# Patient Record
Sex: FEMALE | Race: WHITE | NOT HISPANIC OR LATINO | Employment: OTHER | URBAN - METROPOLITAN AREA
[De-identification: names, ages, dates, MRNs, and addresses within clinical notes are randomized per-mention and may not be internally consistent; named-entity substitution may affect disease eponyms.]

---

## 2018-01-26 ENCOUNTER — OFFICE VISIT (OUTPATIENT)
Dept: NEUROLOGY | Facility: CLINIC | Age: 78
End: 2018-01-26

## 2018-01-26 VITALS
WEIGHT: 110 LBS | BODY MASS INDEX: 20.24 KG/M2 | DIASTOLIC BLOOD PRESSURE: 75 MMHG | HEIGHT: 62 IN | SYSTOLIC BLOOD PRESSURE: 118 MMHG

## 2018-01-26 DIAGNOSIS — F02.80 LATE ONSET ALZHEIMER'S DISEASE WITHOUT BEHAVIORAL DISTURBANCE (HCC): Primary | ICD-10-CM

## 2018-01-26 DIAGNOSIS — G30.1 LATE ONSET ALZHEIMER'S DISEASE WITHOUT BEHAVIORAL DISTURBANCE (HCC): Primary | ICD-10-CM

## 2018-01-26 PROCEDURE — 99205 OFFICE O/P NEW HI 60 MIN: CPT | Performed by: PSYCHIATRY & NEUROLOGY

## 2018-01-26 RX ORDER — ALBUMIN (HUMAN) 12.5 G/50ML
SOLUTION INTRAVENOUS
COMMUNITY
End: 2020-06-16

## 2018-01-26 RX ORDER — PHENOL 1.4 %
600 AEROSOL, SPRAY (ML) MUCOUS MEMBRANE DAILY
COMMUNITY
End: 2020-06-18

## 2018-01-26 RX ORDER — DONEPEZIL HYDROCHLORIDE 5 MG/1
5 TABLET, FILM COATED ORAL DAILY
Qty: 30 TABLET | Refills: 11 | Status: SHIPPED | OUTPATIENT
Start: 2018-01-26 | End: 2018-02-27

## 2018-01-26 RX ORDER — LEVOTHYROXINE SODIUM 0.05 MG/1
TABLET ORAL
Refills: 3 | COMMUNITY
Start: 2017-12-20 | End: 2019-03-15 | Stop reason: SDUPTHER

## 2018-01-26 RX ORDER — MONTELUKAST SODIUM 4 MG/1
TABLET, CHEWABLE ORAL
Refills: 2 | COMMUNITY
Start: 2018-01-18 | End: 2019-02-21 | Stop reason: SDUPTHER

## 2018-01-26 RX ORDER — ESCITALOPRAM OXALATE 10 MG/1
TABLET ORAL
Refills: 5 | COMMUNITY
Start: 2018-01-05 | End: 2018-06-04

## 2018-01-26 RX ORDER — POTASSIUM CHLORIDE 750 MG/1
TABLET, FILM COATED, EXTENDED RELEASE ORAL
COMMUNITY
Start: 2017-10-27 | End: 2020-06-18

## 2018-01-26 RX ORDER — SENNOSIDES 8.6 MG
650 CAPSULE ORAL EVERY 8 HOURS PRN
COMMUNITY
End: 2021-01-28 | Stop reason: SDUPTHER

## 2018-01-26 RX ORDER — ERGOCALCIFEROL (VITAMIN D2) 10 MCG
400 TABLET ORAL DAILY
COMMUNITY
End: 2020-06-18

## 2018-01-26 RX ORDER — AMLODIPINE BESYLATE 2.5 MG/1
TABLET ORAL
Refills: 5 | COMMUNITY
Start: 2018-01-05 | End: 2019-01-30 | Stop reason: SDUPTHER

## 2018-01-26 RX ORDER — LOSARTAN POTASSIUM 100 MG/1
TABLET ORAL
Refills: 2 | COMMUNITY
Start: 2017-12-20 | End: 2019-09-12 | Stop reason: SDUPTHER

## 2018-01-26 NOTE — PROGRESS NOTES
"Subjective     CC: memory loss    History of Present Illness   Julieta King is a 77 y.o. female who comes to clinic today for evaluation of memory impairment. Her family  has noted symptoms since approximately 2014 marked initially by forgetfulness. This has gradually worsened  over time. Additional associated symptoms have included impairments in orientation  and executive function. This has led to difficulty keeping utilities on at home, and missing bills. She has had associated  symptoms of depression . She is currently residing at [facility], where she moved in 11/17.     Prior evaluation has included screening blood work  and a head CT  which were reportedly unremarkable . She may have taken donepezil and memantine in the past, though this is not clear.    I have reviewed and confirmed the past family, social and medical history as accurate on 1/26/18.     Review of Systems   Constitutional: Negative.    Respiratory: Negative.    Cardiovascular: Negative.    Gastrointestinal: Negative.    Genitourinary: Negative.    All other systems reviewed and are negative.      Objective   General appearance today is normal.   Peripheral pulses were present and symmetric.   The ophthalmoscopic exam today is unremarkable. The discs and posterior elements are unremarkable.    /75  Ht 157.5 cm (62\")  Wt 49.9 kg (110 lb)  BMI 20.12 kg/m2    Physical Exam   Neurological: She has normal strength. She has a normal Finger-Nose-Finger Test. Gait normal.   Reflex Scores:       Tricep reflexes are 2+ on the right side and 2+ on the left side.       Bicep reflexes are 2+ on the right side and 2+ on the left side.       Brachioradialis reflexes are 2+ on the right side and 2+ on the left side.       Patellar reflexes are 2+ on the right side and 2+ on the left side.       Achilles reflexes are 2+ on the right side and 2+ on the left side.  Psychiatric: Her speech is normal.        Neurologic Exam     Mental " Status   Oriented to person.   Disoriented to place.   Disoriented to time.   Registration: recalls 3 of 3 objects. Recall of objects at 5 minutes: 0/3. Follows 3 step commands.   Attention: normal.   Speech: speech is normal   Level of consciousness: alert  Knowledge: good.   Able to name object. Able to read. Able to repeat. Able to write. Normal comprehension.     Cranial Nerves   Cranial nerves II through XII intact.     Motor Exam   Muscle bulk: normal  Overall muscle tone: normal    Strength   Strength 5/5 throughout.     Sensory Exam   Light touch normal.     Gait, Coordination, and Reflexes     Gait  Gait: normal    Coordination   Finger to nose coordination: normal    Reflexes   Right brachioradialis: 2+  Left brachioradialis: 2+  Right biceps: 2+  Left biceps: 2+  Right triceps: 2+  Left triceps: 2+  Right patellar: 2+  Left patellar: 2+  Right achilles: 2+  Left achilles: 2+      MMSE=23      Assessment/Plan   Julieta was seen today for memory loss.    Diagnoses and all orders for this visit:    Late onset Alzheimer's disease without behavioral disturbance    Other orders  -     donepezil (ARICEPT) 5 MG tablet; Take 1 tablet by mouth Daily.        DISCUSSION/SUMMARY    Julieta King comes to clinic today for evaluation of memory impairment. Her history and examination, including bedside cognitive testing are most consistent with Alzheimer's Disease .  After discussing potential treatment options, it was elected to add  donepezil. She will then follow up in 1 month , or sooner if needed.     As part of this visit I reviewed prior lab results, reviewed radiology results, reviewed radiology images and obtained additional history from the family which is incorporated in the HPI. Please see above for additional details.

## 2018-02-01 ENCOUNTER — TELEPHONE (OUTPATIENT)
Dept: NEUROLOGY | Facility: CLINIC | Age: 78
End: 2018-02-01
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
## 2018-02-27 ENCOUNTER — OFFICE VISIT (OUTPATIENT)
Dept: NEUROLOGY | Facility: CLINIC | Age: 78
End: 2018-02-27

## 2018-02-27 VITALS — HEIGHT: 62 IN | BODY MASS INDEX: 20.24 KG/M2 | WEIGHT: 110 LBS

## 2018-02-27 DIAGNOSIS — G30.1 LATE ONSET ALZHEIMER'S DISEASE WITHOUT BEHAVIORAL DISTURBANCE (HCC): Primary | ICD-10-CM

## 2018-02-27 DIAGNOSIS — F02.80 LATE ONSET ALZHEIMER'S DISEASE WITHOUT BEHAVIORAL DISTURBANCE (HCC): Primary | ICD-10-CM

## 2018-02-27 PROCEDURE — 99214 OFFICE O/P EST MOD 30 MIN: CPT | Performed by: PHYSICIAN ASSISTANT

## 2018-02-27 RX ORDER — MEMANTINE HYDROCHLORIDE 10 MG/1
TABLET ORAL
Refills: 3 | COMMUNITY
Start: 2018-02-22 | End: 2019-03-21 | Stop reason: SDUPTHER

## 2018-02-27 RX ORDER — DONEPEZIL HYDROCHLORIDE 10 MG/1
10 TABLET, FILM COATED ORAL DAILY
Qty: 30 TABLET | Refills: 11 | Status: SHIPPED | OUTPATIENT
Start: 2018-02-27 | End: 2019-02-18 | Stop reason: SDUPTHER

## 2018-02-27 NOTE — PROGRESS NOTES
"Subjective     Chief Complaint: memory loss      History of Present Illness   Julieta King is a 77 y.o. female who returns to clinic today for evaluation of cognitive impairment. Her family  has noted symptoms since approximately 2014 marked initially by forgetfulness. This has gradually worsened  over time. Additional associated symptoms have included impairments in orientation  and executive function. This has led to difficulty keeping utilities on at home, and missing bills. She has had associated  symptoms of depression . She is currently residing at [facility], where she moved in 11/17.      Prior evaluation has included screening blood work and a head CT which were reportedly unremarkable. She is currently taking donepezil 5mg daily. She may have taken donepezil and memantine in the past, though this is not clear.    Since her last visit in 1/17, she feels essentially unchanged cognitively and her family agrees.       I have reviewed and confirmed the past family, social and medical history as accurate on 2/27/18.     Review of Systems   Constitutional: Negative.    HENT: Negative.    Eyes: Negative.    Respiratory: Negative.    Cardiovascular: Negative.    Gastrointestinal: Negative.    Endocrine: Negative.    Genitourinary: Negative.    Musculoskeletal: Negative.    Skin: Negative.    Allergic/Immunologic: Negative.    Neurological:        As noted in HPI   Hematological: Negative.    Psychiatric/Behavioral:        As noted in HPI       Objective     Ht 157.5 cm (62\")  Wt 49.9 kg (110 lb)  BMI 20.12 kg/m2    General appearance today is normal.       Physical Exam   Constitutional: She is oriented to person, place, and time.   Neurological: She is oriented to person, place, and time. She has normal strength. She has a normal Finger-Nose-Finger Test.   Psychiatric: Her speech is normal.        Neurologic Exam     Mental Status   Oriented to person, place, and time.   Registration: recalls " 3 of 3 objects. Recall of objects at 5 minutes: 0/3 recall. Follows 3 step commands.   Attention: normal.   Speech: speech is normal   Level of consciousness: alert  Able to name object. Able to read. Able to repeat. Able to write. Normal comprehension.     Cranial Nerves   Cranial nerves II through XII intact.     Motor Exam   Muscle bulk: normal  Overall muscle tone: normal    Strength   Strength 5/5 throughout.     Sensory Exam   Light touch normal.     Gait, Coordination, and Reflexes     Coordination   Finger to nose coordination: normal    Tremor   Resting tremor: absent        Results  MMSE= 25 (23 in 1/18)       Assessment/Plan   Julieta was seen today for alzheimer's disease.    Diagnoses and all orders for this visit:    Late onset Alzheimer's disease without behavioral disturbance    Other orders  -     donepezil (ARICEPT) 10 MG tablet; Take 1 tablet by mouth Daily.          Discussion/Summary   Julieta King returns to clinic today for evaluation of Alzheimer's Disease . After discussing potential treatment options, it was elected to increase  donepezil and add memantine. She will then follow up in 3 months , or sooner if needed.       I spent 20 minutes out of 25 minutes face to face with the patient and family and discussing diagnosis, prognosis, evaluation, current status, treatment options and management.    As part of this visit I obtained additional history from the family which is incorporated in the HPI.      Brooke Salgado PA-C

## 2018-03-06 ENCOUNTER — TELEPHONE (OUTPATIENT)
Dept: NEUROLOGY | Facility: CLINIC | Age: 78
End: 2018-03-06

## 2018-03-06 NOTE — TELEPHONE ENCOUNTER
----- Message from Brooke Salgado PA-C sent at 3/5/2018  3:55 PM EST -----  Regarding: FW: episode of confusion  Maddy,    Would you care to let him know that I am not entirely sure what the cause of the episode was. I don't think that it is likely a medication interaction with the alcohol looking at her medication list nor is likely related to solely  the wine as she only had one glass. If it continues or happens more frequently, I would recommend seeing her PCP to rule out an underlying medical illness such as a UTI. Hope that helps. Thanks.       ----- Message -----     From: YOLANDA Jin     Sent: 3/5/2018  11:25 AM       To: Brooke Salgado PA-C  Subject: episode of confusion                             Son said he was having dinner with her last week and she had a moment of confusion where she had a glass of wine and then didn't recognize him and became very paranoid and took stff to calm her down and get in car. Alcohol interaction with meds or something else?

## 2018-05-30 ENCOUNTER — TELEPHONE (OUTPATIENT)
Dept: NEUROLOGY | Facility: CLINIC | Age: 78
End: 2018-05-30

## 2018-06-01 NOTE — TELEPHONE ENCOUNTER
Spoke with the PTs son, he would like to try the increase of LEXAPRO to 20mg. Please send to pharm on file.   I asked him to give us an update on his mother next time he is in town to see if this increase helped any and if not maybe we can try to Mirtazapine then.

## 2018-06-04 RX ORDER — ESCITALOPRAM OXALATE 20 MG/1
20 TABLET ORAL DAILY
Qty: 30 TABLET | Refills: 11 | Status: SHIPPED | OUTPATIENT
Start: 2018-06-04 | End: 2019-04-26 | Stop reason: SDUPTHER

## 2018-06-07 ENCOUNTER — TRANSCRIBE ORDERS (OUTPATIENT)
Dept: ADMINISTRATIVE | Facility: HOSPITAL | Age: 78
End: 2018-06-07

## 2018-06-07 DIAGNOSIS — N95.9 MENOPAUSAL AND PERIMENOPAUSAL DISORDER: Primary | ICD-10-CM

## 2018-06-21 ENCOUNTER — APPOINTMENT (OUTPATIENT)
Dept: BONE DENSITY | Facility: HOSPITAL | Age: 78
End: 2018-06-21
Attending: INTERNAL MEDICINE

## 2018-06-25 ENCOUNTER — TRANSCRIBE ORDERS (OUTPATIENT)
Dept: ADMINISTRATIVE | Facility: HOSPITAL | Age: 78
End: 2018-06-25

## 2018-06-25 DIAGNOSIS — N95.9 MENOPAUSAL AND POSTMENOPAUSAL DISORDER: Primary | ICD-10-CM

## 2019-01-24 RX ORDER — AMLODIPINE BESYLATE 2.5 MG/1
TABLET ORAL
Qty: 30 TABLET | Refills: 0 | OUTPATIENT
Start: 2019-01-24

## 2019-01-30 ENCOUNTER — TELEPHONE (OUTPATIENT)
Dept: INTERNAL MEDICINE | Facility: CLINIC | Age: 79
End: 2019-01-30

## 2019-01-30 RX ORDER — AMLODIPINE BESYLATE 2.5 MG/1
2.5 TABLET ORAL DAILY
Qty: 30 TABLET | Refills: 5 | Status: SHIPPED | OUTPATIENT
Start: 2019-01-30 | End: 2019-06-27 | Stop reason: SDUPTHER

## 2019-01-30 RX ORDER — AMLODIPINE BESYLATE 2.5 MG/1
TABLET ORAL
Refills: 5 | OUTPATIENT
Start: 2019-01-30

## 2019-01-31 ENCOUNTER — TELEPHONE (OUTPATIENT)
Dept: INTERNAL MEDICINE | Facility: CLINIC | Age: 79
End: 2019-01-31

## 2019-02-06 ENCOUNTER — TELEPHONE (OUTPATIENT)
Dept: INTERNAL MEDICINE | Facility: CLINIC | Age: 79
End: 2019-02-06

## 2019-02-06 ENCOUNTER — OFFICE VISIT (OUTPATIENT)
Dept: INTERNAL MEDICINE | Facility: CLINIC | Age: 79
End: 2019-02-06

## 2019-02-06 VITALS
HEIGHT: 62 IN | HEART RATE: 60 BPM | SYSTOLIC BLOOD PRESSURE: 142 MMHG | BODY MASS INDEX: 30.91 KG/M2 | WEIGHT: 168 LBS | DIASTOLIC BLOOD PRESSURE: 78 MMHG

## 2019-02-06 DIAGNOSIS — E66.1 CLASS 1 DRUG-INDUCED OBESITY WITH SERIOUS COMORBIDITY AND BODY MASS INDEX (BMI) OF 30.0 TO 30.9 IN ADULT: ICD-10-CM

## 2019-02-06 DIAGNOSIS — F02.80 LATE ONSET ALZHEIMER'S DISEASE WITHOUT BEHAVIORAL DISTURBANCE (HCC): ICD-10-CM

## 2019-02-06 DIAGNOSIS — E78.2 MIXED HYPERLIPIDEMIA: ICD-10-CM

## 2019-02-06 DIAGNOSIS — E55.9 VITAMIN D DEFICIENCY: ICD-10-CM

## 2019-02-06 DIAGNOSIS — I10 BENIGN ESSENTIAL HYPERTENSION: Primary | ICD-10-CM

## 2019-02-06 DIAGNOSIS — M85.89 OSTEOPENIA OF MULTIPLE SITES: ICD-10-CM

## 2019-02-06 DIAGNOSIS — E03.9 HYPOTHYROIDISM (ACQUIRED): ICD-10-CM

## 2019-02-06 DIAGNOSIS — K21.9 GERD WITHOUT ESOPHAGITIS: ICD-10-CM

## 2019-02-06 DIAGNOSIS — G30.1 LATE ONSET ALZHEIMER'S DISEASE WITHOUT BEHAVIORAL DISTURBANCE (HCC): ICD-10-CM

## 2019-02-06 LAB
25(OH)D3 SERPL-MCNC: 66 NG/ML
ALBUMIN SERPL-MCNC: 4.68 G/DL (ref 3.2–4.8)
ALBUMIN/GLOB SERPL: 2.6 G/DL (ref 1.5–2.5)
ALP SERPL-CCNC: 89 U/L (ref 25–100)
ALT SERPL W P-5'-P-CCNC: 8 U/L (ref 7–40)
ANION GAP SERPL CALCULATED.3IONS-SCNC: 9 MMOL/L (ref 3–11)
AST SERPL-CCNC: 16 U/L (ref 0–33)
BASOPHILS # BLD AUTO: 0.06 10*3/MM3 (ref 0–0.2)
BASOPHILS NFR BLD AUTO: 1 % (ref 0–1)
BILIRUB SERPL-MCNC: 0.6 MG/DL (ref 0.3–1.2)
BUN BLD-MCNC: 15 MG/DL (ref 9–23)
BUN/CREAT SERPL: 20.8 (ref 7–25)
CALCIUM SPEC-SCNC: 10.4 MG/DL (ref 8.7–10.4)
CHLORIDE SERPL-SCNC: 107 MMOL/L (ref 99–109)
CO2 SERPL-SCNC: 24 MMOL/L (ref 20–31)
CREAT BLD-MCNC: 0.72 MG/DL (ref 0.6–1.3)
DEPRECATED RDW RBC AUTO: 45.3 FL (ref 37–54)
EOSINOPHIL # BLD AUTO: 0.37 10*3/MM3 (ref 0–0.3)
EOSINOPHIL NFR BLD AUTO: 6 % (ref 0–3)
ERYTHROCYTE [DISTWIDTH] IN BLOOD BY AUTOMATED COUNT: 13.1 % (ref 11.3–14.5)
GFR SERPL CREATININE-BSD FRML MDRD: 78 ML/MIN/1.73
GLOBULIN UR ELPH-MCNC: 1.8 GM/DL
GLUCOSE BLD-MCNC: 89 MG/DL (ref 70–100)
HCT VFR BLD AUTO: 44 % (ref 34.5–44)
HGB BLD-MCNC: 13.6 G/DL (ref 11.5–15.5)
IMM GRANULOCYTES # BLD AUTO: 0.01 10*3/MM3 (ref 0–0.03)
IMM GRANULOCYTES NFR BLD AUTO: 0.2 % (ref 0–0.6)
LYMPHOCYTES # BLD AUTO: 1.59 10*3/MM3 (ref 0.6–4.8)
LYMPHOCYTES NFR BLD AUTO: 25.7 % (ref 24–44)
MCH RBC QN AUTO: 29.3 PG (ref 27–31)
MCHC RBC AUTO-ENTMCNC: 30.9 G/DL (ref 32–36)
MCV RBC AUTO: 94.8 FL (ref 80–99)
MONOCYTES # BLD AUTO: 0.52 10*3/MM3 (ref 0–1)
MONOCYTES NFR BLD AUTO: 8.4 % (ref 0–12)
NEUTROPHILS # BLD AUTO: 3.63 10*3/MM3 (ref 1.5–8.3)
NEUTROPHILS NFR BLD AUTO: 58.7 % (ref 41–71)
PLATELET # BLD AUTO: 285 10*3/MM3 (ref 150–450)
PMV BLD AUTO: 10.7 FL (ref 6–12)
POTASSIUM BLD-SCNC: 4.4 MMOL/L (ref 3.5–5.5)
PROT SERPL-MCNC: 6.5 G/DL (ref 5.7–8.2)
RBC # BLD AUTO: 4.64 10*6/MM3 (ref 3.89–5.14)
SODIUM BLD-SCNC: 140 MMOL/L (ref 132–146)
TSH SERPL DL<=0.05 MIU/L-ACNC: 1.07 MIU/ML (ref 0.35–5.35)
WBC NRBC COR # BLD: 6.18 10*3/MM3 (ref 3.5–10.8)

## 2019-02-06 PROCEDURE — 82306 VITAMIN D 25 HYDROXY: CPT | Performed by: INTERNAL MEDICINE

## 2019-02-06 PROCEDURE — 36415 COLL VENOUS BLD VENIPUNCTURE: CPT | Performed by: INTERNAL MEDICINE

## 2019-02-06 PROCEDURE — 99214 OFFICE O/P EST MOD 30 MIN: CPT | Performed by: INTERNAL MEDICINE

## 2019-02-06 PROCEDURE — 80053 COMPREHEN METABOLIC PANEL: CPT | Performed by: INTERNAL MEDICINE

## 2019-02-06 PROCEDURE — 85025 COMPLETE CBC W/AUTO DIFF WBC: CPT | Performed by: INTERNAL MEDICINE

## 2019-02-06 PROCEDURE — 84443 ASSAY THYROID STIM HORMONE: CPT | Performed by: INTERNAL MEDICINE

## 2019-02-06 NOTE — PATIENT INSTRUCTIONS
Continue current medications for blood pressure and cholesterol and thyroid and memory.  Try to be more active and walk around a lot more.  Try to avoid eating too much salt.  Try to avoid eating desserts more than once or twice a week.  We will continue having [employer] fell your pillboxes.  We are not making any changes in medications today.    Social activities and reading and physical exercise and walking all help our memory.

## 2019-02-06 NOTE — PROGRESS NOTES
[facility]     Julieta King  1940   [id number]      Patient Care Team:  Mary Jane Goss MD as PCP - General  Mary Jane Goss MD as PCP - Family Medicine    Chief Complaint;:   Chief Complaint   Patient presents with   • Med Refill   • Hypertension            HPI  Patient is a 78 y.o. female presents with follow-up of chronic conditions including hypertension and hypothyroidism and dementia.  She is here with her son today to contributes to the history.      CHRONIC CONDITIONS  She lives at [facility] in independent living.  She likes it a lot.  She states that the food is good and they do all the cooking and they clean her room so she is happy.  She enjoys the social aspects of living there.    She is taking her medications as prescribed.  [employer] fills her pill boxes every week.  Her son states that her memory problems have slowed down a bit since she has been on donepezil and memantine.  She is not having any trouble dressing.  She states that she knows when it is time to go to the dining room for meals.  She has not had any trouble walking.  She denies trouble bathing.    She has not checked her blood pressure since she was here last.  She eats food that they prepare in the dining room at [facility] so she does get more salt and sugars and desserts than we would think is ideal.  But she is happy and feels well.    She does not really get any exercise other than walking around at [facility].  She is on vitamin D 400 units a day.    She denies any heartburn or indigestion.    She is taking the level thyroxine daily.        Past Medical History:   Diagnosis Date   • Benign essential hypertension    • GERD without esophagitis    • Hyperlipidemia    • Hypertension    • Hypothyroidism (acquired)    • Memory deficit    • Mixed hyperlipidemia    • Osteopenia of multiple sites    • Seborrheic keratoses    • Vitamin D deficiency        Past Surgical History:   Procedure Laterality Date    • BLEPHAROPLASTY Bilateral 2007   • TOTAL HIP ARTHROPLASTY Bilateral 1992   • TOTAL HIP ARTHROPLASTY REVISION Right 01/28/2013   • TOTAL HIP ARTHROPLASTY REVISION Left 2012       Family History   Problem Relation Age of Onset   • Dementia Father    • Diabetes Father    • Coronary artery disease Father    • Hypertension Father    • Coronary artery disease Mother    • Hypertension Mother    • Stroke Mother         hemorrhagic after TPA   • Coronary artery disease Paternal Uncle    • Ovarian cancer Maternal Grandmother        Social History     Socioeconomic History   • Marital status:      Spouse name: Not on file   • Number of children: Not on file   • Years of education: Not on file   • Highest education level: Not on file   Social Needs   • Financial resource strain: Not on file   • Food insecurity - worry: Not on file   • Food insecurity - inability: Not on file   • Transportation needs - medical: Not on file   • Transportation needs - non-medical: Not on file   Occupational History   • Not on file   Tobacco Use   • Smoking status: Never Smoker   • Smokeless tobacco: Never Used   Substance and Sexual Activity   • Alcohol use: Yes     Comment: rarely   • Drug use: No   • Sexual activity: Defer   Other Topics Concern   • Not on file   Social History Narrative   • Not on file       Allergies   Allergen Reactions   • Lisinopril Cough       Review of Systems:     Review of Systems   Constitutional: Negative for chills and fever.   HENT: Negative for sinus pressure and sore throat.    Respiratory: Negative for chest tightness and shortness of breath.    Cardiovascular: Negative for chest pain and palpitations.   Gastrointestinal: Negative for abdominal pain, diarrhea and GERD.   Endocrine: Negative for cold intolerance and heat intolerance.   Genitourinary: Negative for dysuria and frequency.   Musculoskeletal: Negative for arthralgias, back pain and neck pain.   Skin: Negative for color change and rash.  "  Neurological: Positive for memory problem. Negative for dizziness and headache.   Psychiatric/Behavioral: Negative for depressed mood. The patient is not nervous/anxious.        Vital Signs  Vitals:    02/06/19 1340 02/06/19 1501   BP: 168/90 142/78   BP Location: Right arm Right arm   Patient Position: Sitting Sitting   Cuff Size: Adult Adult   Pulse: 60    Weight: 76.2 kg (168 lb)    Height: 157.5 cm (62.01\")    PainSc: 0-No pain      Body mass index is 30.72 kg/m².      Current Outpatient Medications:   •  acetaminophen (TYLENOL) 650 MG 8 hr tablet, Take 650 mg by mouth Every 8 (Eight) Hours As Needed for Mild Pain ., Disp: , Rfl:   •  albumin human (ALBUTEIN) 25 % 25%, Infuse  into a venous catheter., Disp: , Rfl:   •  amLODIPine (NORVASC) 2.5 MG tablet, Take 1 tablet by mouth Daily., Disp: 30 tablet, Rfl: 5  •  calcium carbonate (OS-SONIDO) 600 MG tablet, Take 600 mg by mouth Daily., Disp: , Rfl:   •  colestipol (COLESTID) 1 g tablet, , Disp: , Rfl: 2  •  Cyanocobalamin (VITAMIN B-12) 500 MCG lozenge, Take  by mouth., Disp: , Rfl:   •  donepezil (ARICEPT) 10 MG tablet, Take 1 tablet by mouth Daily., Disp: 30 tablet, Rfl: 11  •  escitalopram (LEXAPRO) 20 MG tablet, Take 1 tablet by mouth Daily., Disp: 30 tablet, Rfl: 11  •  levothyroxine (SYNTHROID, LEVOTHROID) 50 MCG tablet, , Disp: , Rfl: 3  •  losartan (COZAAR) 100 MG tablet, , Disp: , Rfl: 2  •  memantine (NAMENDA) 10 MG tablet, , Disp: , Rfl: 3  •  potassium chloride (K-DUR) 10 MEQ CR tablet, , Disp: , Rfl:   •  Probiotic Product (PROBIOTIC DAILY PO), Take  by mouth., Disp: , Rfl:   •  Vitamin D, Cholecalciferol, (CHOLECALCIFEROL) 400 units tablet, Take 400 Units by mouth Daily., Disp: , Rfl:     Physical Exam:    Physical Exam   Constitutional: She is oriented to person, place, and time. She appears well-developed and well-nourished. She is obese.  HENT:   Head: Normocephalic.   Eyes: Conjunctivae and EOM are normal. Pupils are equal, round, and reactive to " light.   Neck: Normal range of motion. Neck supple. No thyromegaly present.   Cardiovascular: Normal rate, regular rhythm, normal heart sounds and intact distal pulses.   Pulmonary/Chest: Effort normal and breath sounds normal.   Musculoskeletal: Normal range of motion. She exhibits no edema.   Lymphadenopathy:     She has no cervical adenopathy.   Neurological: She is alert and oriented to person, place, and time. She has normal strength. No cranial nerve deficit or sensory deficit. Gait normal.   Psychiatric: She has a normal mood and affect. Her speech is normal and behavior is normal. Thought content normal. Her mood appears not anxious. Cognition and memory are impaired. She does not exhibit a depressed mood. She exhibits abnormal recent memory.   Nursing note and vitals reviewed.       Results Review:    None    CMP:  Lab Results   Component Value Date    BUN <5 (L) 01/12/2015    CREATININE 0.4 (L) 01/12/2015     01/12/2015    K 3.8 01/12/2015    CO2 26 01/12/2015    CALCIUM 9.1 01/12/2015    ALBUMIN 3.6 01/12/2015    BILITOT 0.4 01/12/2015    ALKPHOS 50 01/12/2015    AST 23 01/12/2015    ALT 78 (H) 01/12/2015     HbA1c:  No results found for: HGBA1C  Microalbumin:  No results found for: MICROALBUR, POCMALB, POCCREAT  Lipid Panel  Lab Results   Component Value Date    AST 23 01/12/2015    ALT 78 (H) 01/12/2015       Medication Review: Medications reviewed and noted    Assessment/Plan:    Julieta was seen today for med refill and hypertension.    Diagnoses and all orders for this visit:    Benign essential hypertension  -     Comprehensive Metabolic Panel; Future  -     CBC & Differential; Future  -     Comprehensive Metabolic Panel  -     CBC & Differential  -     CBC Auto Differential    Mixed hyperlipidemia    Vitamin D deficiency  -     Vitamin D 25 hydroxy; Future  -     Vitamin D 25 hydroxy    GERD without esophagitis    Late onset Alzheimer's disease without behavioral disturbance    Osteopenia of  multiple sites  -     Vitamin D 25 hydroxy; Future  -     Vitamin D 25 hydroxy    Hypothyroidism (acquired)  -     TSH; Future  -     TSH    Class 1 drug-induced obesity with serious comorbidity and body mass index (BMI) of 30.0 to 30.9 in adult        Patient Instructions   Continue current medications for blood pressure and cholesterol and thyroid and memory.  Try to be more active and walk around a lot more.  Try to avoid eating too much salt.  Try to avoid eating desserts more than once or twice a week.  We will continue having [employer] fell your pillboxes.  We are not making any changes in medications today.    Social activities and reading and physical exercise and walking all help our memory.      Plan of care reviewed with patient at the conclusion of today's visit. Education was provided regarding diagnosis, management, and any prescribed or recommended OTC medications.Patient verbalizes understanding of and agreement with management plan.         Mary Jane Goss MD

## 2019-02-18 ENCOUNTER — TELEPHONE (OUTPATIENT)
Dept: NEUROLOGY | Facility: CLINIC | Age: 79
End: 2019-02-18

## 2019-02-18 RX ORDER — DONEPEZIL HYDROCHLORIDE 10 MG/1
10 TABLET, FILM COATED ORAL DAILY
Qty: 30 TABLET | Refills: 11 | Status: SHIPPED | OUTPATIENT
Start: 2019-02-18 | End: 2020-01-22

## 2019-02-18 NOTE — TELEPHONE ENCOUNTER
----- Message from Cedrick Cole Rep sent at 2/18/2019  1:25 PM EST -----  Contact: PHARMACY  CYNTHIA SHAIKH:    PT NEEDS REFILL ON : donepezil (ARICEPT) 10 MG tablet  PHARMACY: [facility]    CALLBACK: [phone] EXT 3, ASK FOR DORCAS.

## 2019-02-21 RX ORDER — MONTELUKAST SODIUM 4 MG/1
TABLET, CHEWABLE ORAL
Qty: 60 TABLET | Refills: 2 | Status: SHIPPED | OUTPATIENT
Start: 2019-02-21 | End: 2019-05-22 | Stop reason: SDUPTHER

## 2019-03-15 RX ORDER — LEVOTHYROXINE SODIUM 0.05 MG/1
TABLET ORAL
Qty: 90 TABLET | Refills: 3 | Status: SHIPPED | OUTPATIENT
Start: 2019-03-15 | End: 2020-03-05

## 2019-03-21 RX ORDER — MEMANTINE HYDROCHLORIDE 10 MG/1
TABLET ORAL
Qty: 60 TABLET | Refills: 2 | Status: SHIPPED | OUTPATIENT
Start: 2019-03-21 | End: 2019-06-20 | Stop reason: SDUPTHER

## 2019-04-26 RX ORDER — ESCITALOPRAM OXALATE 20 MG/1
20 TABLET ORAL DAILY
Qty: 30 TABLET | Refills: 11 | Status: SHIPPED | OUTPATIENT
Start: 2019-04-26 | End: 2020-04-10

## 2019-05-22 RX ORDER — MONTELUKAST SODIUM 4 MG/1
TABLET, CHEWABLE ORAL
Qty: 60 TABLET | Refills: 2 | Status: SHIPPED | OUTPATIENT
Start: 2019-05-22 | End: 2019-08-23 | Stop reason: SDUPTHER

## 2019-06-20 RX ORDER — MEMANTINE HYDROCHLORIDE 10 MG/1
TABLET ORAL
Qty: 60 TABLET | Refills: 2 | Status: SHIPPED | OUTPATIENT
Start: 2019-06-20 | End: 2019-09-19 | Stop reason: SDUPTHER

## 2019-06-27 ENCOUNTER — TELEPHONE (OUTPATIENT)
Dept: INTERNAL MEDICINE | Facility: CLINIC | Age: 79
End: 2019-06-27

## 2019-06-27 DIAGNOSIS — R41.82 ALTERED MENTAL STATUS, UNSPECIFIED ALTERED MENTAL STATUS TYPE: Primary | ICD-10-CM

## 2019-06-27 RX ORDER — AMLODIPINE BESYLATE 2.5 MG/1
2.5 TABLET ORAL DAILY
Qty: 30 TABLET | Refills: 5 | Status: SHIPPED | OUTPATIENT
Start: 2019-06-27 | End: 2019-12-20

## 2019-06-27 NOTE — TELEPHONE ENCOUNTER
WILFREDO FROM [facility] [phone]  CALLED AND STATED THAT PATIENT IS AT [facility] AND THE NURSING STAFF IS CONCERNED ABOUT PATIENT.    SHE IS DISPLAYING ALTERED MENTAL STATUS, CONFUSION AND AGITATION.  WOULD LIKE FOR HOME HEALTH TO COME OUT AND ACCESS PATIENT WITH VITALS AND COLLECT A URINE FOR U/A AND CULTURE    NEED ORDER FAXED [phone]  AND NEED LAST OFFICE NOTE, DEMOGRAPHIC FACE SHEET.    INFORMATION RELAYED VERBALLY TO CARTER BRO AND SHE AGREED.     INFORMATION FAXED OVER

## 2019-07-03 ENCOUNTER — TELEPHONE (OUTPATIENT)
Dept: INTERNAL MEDICINE | Facility: CLINIC | Age: 79
End: 2019-07-03

## 2019-07-03 NOTE — TELEPHONE ENCOUNTER
Called to request results for urine culture ordered 6/27/19.  Rosa RN (Home Health) states patient refused testing.       Order was canceled

## 2019-08-23 RX ORDER — MONTELUKAST SODIUM 4 MG/1
TABLET, CHEWABLE ORAL
Qty: 60 TABLET | Refills: 2 | Status: SHIPPED | OUTPATIENT
Start: 2019-08-23 | End: 2019-11-22 | Stop reason: SDUPTHER

## 2019-09-12 RX ORDER — LOSARTAN POTASSIUM 100 MG/1
TABLET ORAL
Qty: 90 TABLET | Refills: 1 | Status: SHIPPED | OUTPATIENT
Start: 2019-09-12 | End: 2020-03-11

## 2019-09-19 RX ORDER — MEMANTINE HYDROCHLORIDE 10 MG/1
TABLET ORAL
Qty: 60 TABLET | Refills: 2 | Status: SHIPPED | OUTPATIENT
Start: 2019-09-19 | End: 2019-12-20

## 2019-09-30 PROBLEM — Z96.652 CHRONIC KNEE PAIN AFTER TOTAL REPLACEMENT OF LEFT KNEE JOINT: Status: ACTIVE | Noted: 2019-09-30

## 2019-09-30 PROBLEM — M25.562 CHRONIC KNEE PAIN AFTER TOTAL REPLACEMENT OF LEFT KNEE JOINT: Status: ACTIVE | Noted: 2019-09-30

## 2019-09-30 PROBLEM — M19.90 OSTEOARTHRITIS: Status: ACTIVE | Noted: 2019-09-30

## 2019-09-30 PROBLEM — G89.29 CHRONIC KNEE PAIN AFTER TOTAL REPLACEMENT OF LEFT KNEE JOINT: Status: ACTIVE | Noted: 2019-09-30

## 2019-09-30 PROBLEM — M25.552 PAIN OF BOTH HIP JOINTS: Status: ACTIVE | Noted: 2019-09-30

## 2019-09-30 PROBLEM — M25.551 PAIN OF BOTH HIP JOINTS: Status: ACTIVE | Noted: 2019-09-30

## 2019-09-30 PROBLEM — E83.52 HYPERCALCEMIA: Status: ACTIVE | Noted: 2019-09-30

## 2019-09-30 PROBLEM — E87.1 HYPONATREMIA: Status: ACTIVE | Noted: 2019-09-30

## 2019-11-22 RX ORDER — MONTELUKAST SODIUM 4 MG/1
TABLET, CHEWABLE ORAL
Qty: 60 TABLET | Refills: 0 | Status: SHIPPED | OUTPATIENT
Start: 2019-11-22 | End: 2020-01-22

## 2019-12-12 RX ORDER — LEVOTHYROXINE SODIUM 0.05 MG/1
TABLET ORAL
Qty: 90 TABLET | Refills: 3 | OUTPATIENT
Start: 2019-12-12

## 2019-12-20 RX ORDER — AMLODIPINE BESYLATE 2.5 MG/1
2.5 TABLET ORAL DAILY
Qty: 30 TABLET | Refills: 2 | Status: SHIPPED | OUTPATIENT
Start: 2019-12-20 | End: 2020-03-18 | Stop reason: SDUPTHER

## 2019-12-20 RX ORDER — MEMANTINE HYDROCHLORIDE 10 MG/1
TABLET ORAL
Qty: 60 TABLET | Refills: 2 | Status: SHIPPED | OUTPATIENT
Start: 2019-12-20 | End: 2020-03-18 | Stop reason: SDUPTHER

## 2020-01-22 RX ORDER — DONEPEZIL HYDROCHLORIDE 10 MG/1
10 TABLET, FILM COATED ORAL DAILY
Qty: 30 TABLET | Refills: 0 | Status: SHIPPED | OUTPATIENT
Start: 2020-01-22 | End: 2020-03-31 | Stop reason: SDUPTHER

## 2020-01-22 RX ORDER — MONTELUKAST SODIUM 4 MG/1
TABLET, CHEWABLE ORAL
Qty: 60 TABLET | Refills: 0 | Status: SHIPPED | OUTPATIENT
Start: 2020-01-22 | End: 2020-02-25 | Stop reason: SDUPTHER

## 2020-01-22 NOTE — TELEPHONE ENCOUNTER
Filled for 30 day supply, 0 refills    Note: PLEASE SCHEDULE FUP APPT FOR FURTHER REFILLS. THANKS!

## 2020-02-21 RX ORDER — MONTELUKAST SODIUM 4 MG/1
1 TABLET, CHEWABLE ORAL 2 TIMES DAILY
Qty: 60 TABLET | Refills: 0 | Status: CANCELLED | OUTPATIENT
Start: 2020-02-21

## 2020-02-21 NOTE — TELEPHONE ENCOUNTER
Siddhartha requested refill on patients Colestipol . I called to let her know we can refill it until she makes and keeps an appt

## 2020-02-25 RX ORDER — MONTELUKAST SODIUM 4 MG/1
1 TABLET, CHEWABLE ORAL 2 TIMES DAILY
Qty: 60 TABLET | Refills: 2 | Status: SHIPPED | OUTPATIENT
Start: 2020-02-25 | End: 2020-05-27

## 2020-02-25 NOTE — TELEPHONE ENCOUNTER
Called son advised per note he verbalized understanding and said he will get with his wife to see when would be a good time to come and call back tomorrow

## 2020-02-25 NOTE — TELEPHONE ENCOUNTER
Called patients son advised him that patient needs to make an appt. to get her refill of Colestipol he said his mom has Alzheimer's and lives in assisted living he lives in [place] and will have to drive 8 hrs. or fly in to get her here just to get patients cholesterol medication but if he has to he will

## 2020-02-25 NOTE — TELEPHONE ENCOUNTER
Call the son back and let him know that I did send refills in .  I understand that he is far away.  We do need to see her at least once a year to be able to continue prescribing and also to make sure she is doing okay with what we are giving her.    Make an [facility] appointment (both parts with me) for sometime within the next 3 months that is convenient for him.

## 2020-03-05 RX ORDER — LEVOTHYROXINE SODIUM 0.05 MG/1
TABLET ORAL
Qty: 30 TABLET | Refills: 0 | Status: SHIPPED | OUTPATIENT
Start: 2020-03-05 | End: 2020-04-07

## 2020-03-11 RX ORDER — LOSARTAN POTASSIUM 100 MG/1
TABLET ORAL
Qty: 90 TABLET | Refills: 0 | Status: SHIPPED | OUTPATIENT
Start: 2020-03-11 | End: 2020-06-16 | Stop reason: SDUPTHER

## 2020-03-18 DIAGNOSIS — I10 BENIGN ESSENTIAL HYPERTENSION: Primary | ICD-10-CM

## 2020-03-18 RX ORDER — MEMANTINE HYDROCHLORIDE 10 MG/1
10 TABLET ORAL 2 TIMES DAILY
Qty: 60 TABLET | Refills: 0 | Status: SHIPPED | OUTPATIENT
Start: 2020-03-18 | End: 2020-05-13

## 2020-03-18 RX ORDER — AMLODIPINE BESYLATE 2.5 MG/1
2.5 TABLET ORAL DAILY
Qty: 30 TABLET | Refills: 0 | Status: SHIPPED | OUTPATIENT
Start: 2020-03-18 | End: 2020-05-21

## 2020-03-31 RX ORDER — DONEPEZIL HYDROCHLORIDE 10 MG/1
10 TABLET, FILM COATED ORAL DAILY
Qty: 30 TABLET | Refills: 1 | Status: SHIPPED | OUTPATIENT
Start: 2020-03-31 | End: 2020-04-20

## 2020-04-07 RX ORDER — LEVOTHYROXINE SODIUM 0.05 MG/1
TABLET ORAL
Qty: 30 TABLET | Refills: 0 | Status: SHIPPED | OUTPATIENT
Start: 2020-04-07 | End: 2020-05-04

## 2020-04-07 NOTE — TELEPHONE ENCOUNTER
Please contact patient's son and talk to him about possibly getting the doctor at the facility where his mother lives to follow her since I know he [place] out of state and it is very difficult for him to come home and bring her to our office.  It is concerning that no doctor has seen her since February 2019.  She has not had any labs since then either.  I would feel more comfortable if someone were looking out after her.    If there is not a doctor who goes to her facility, there are doctors groups that do only housecalls and do not have offices.    I will send another 1 month supply of the levothyroxine to give him time to arrange for her care.

## 2020-04-10 RX ORDER — ESCITALOPRAM OXALATE 20 MG/1
20 TABLET ORAL DAILY
Qty: 30 TABLET | Refills: 11 | Status: SHIPPED | OUTPATIENT
Start: 2020-04-10 | End: 2020-06-16 | Stop reason: SDUPTHER

## 2020-04-20 RX ORDER — DONEPEZIL HYDROCHLORIDE 10 MG/1
TABLET, FILM COATED ORAL
Qty: 30 TABLET | Refills: 1 | Status: SHIPPED | OUTPATIENT
Start: 2020-04-20 | End: 2020-06-16 | Stop reason: SDUPTHER

## 2020-05-04 RX ORDER — LEVOTHYROXINE SODIUM 0.05 MG/1
TABLET ORAL
Qty: 30 TABLET | Refills: 5 | Status: SHIPPED | OUTPATIENT
Start: 2020-05-04 | End: 2020-05-04 | Stop reason: SDUPTHER

## 2020-05-04 RX ORDER — LEVOTHYROXINE SODIUM 0.05 MG/1
50 TABLET ORAL DAILY
Qty: 30 TABLET | Refills: 0 | Status: SHIPPED | OUTPATIENT
Start: 2020-05-04 | End: 2020-12-03

## 2020-05-13 RX ORDER — MEMANTINE HYDROCHLORIDE 10 MG/1
TABLET ORAL
Qty: 60 TABLET | Refills: 0 | Status: SHIPPED | OUTPATIENT
Start: 2020-05-13 | End: 2020-06-12

## 2020-05-20 DIAGNOSIS — I10 BENIGN ESSENTIAL HYPERTENSION: ICD-10-CM

## 2020-05-21 RX ORDER — AMLODIPINE BESYLATE 2.5 MG/1
TABLET ORAL
Qty: 30 TABLET | Refills: 0 | Status: SHIPPED | OUTPATIENT
Start: 2020-05-21 | End: 2020-05-22

## 2020-05-22 DIAGNOSIS — I10 BENIGN ESSENTIAL HYPERTENSION: ICD-10-CM

## 2020-05-22 RX ORDER — AMLODIPINE BESYLATE 2.5 MG/1
TABLET ORAL
Qty: 30 TABLET | Refills: 1 | Status: SHIPPED | OUTPATIENT
Start: 2020-05-22 | End: 2020-06-16 | Stop reason: SDUPTHER

## 2020-05-27 RX ORDER — MONTELUKAST SODIUM 4 MG/1
1 TABLET, CHEWABLE ORAL 2 TIMES DAILY
Qty: 60 TABLET | Refills: 5 | Status: SHIPPED | OUTPATIENT
Start: 2020-05-27 | End: 2020-06-16 | Stop reason: SDUPTHER

## 2020-06-12 RX ORDER — MEMANTINE HYDROCHLORIDE 10 MG/1
TABLET ORAL
Qty: 60 TABLET | Refills: 0 | Status: SHIPPED | OUTPATIENT
Start: 2020-06-12 | End: 2020-06-16 | Stop reason: SDUPTHER

## 2020-06-12 NOTE — TELEPHONE ENCOUNTER
We are constantly getting request for refills on Ms. King medications.  She has dementia and lives at assisted living.  Her son lives in [place] I think.  He does not feel he can get away and come to take her to appointments.  Most of the assisted living places have people who will drive residence to their appointments so I do not know if hers does not?      Please call the son to see what we can do.  I last saw her February 2019.  It is dangerous for me to continue refilling prescriptions when I do not know at all what is going on with her.    There may be a doctor or PA who makes rounds at her assisted living place and that person could be her primary care provider.  That would be much safer because they could check on her frequently.

## 2020-06-16 ENCOUNTER — LAB (OUTPATIENT)
Dept: LAB | Facility: HOSPITAL | Age: 80
End: 2020-06-16

## 2020-06-16 ENCOUNTER — OFFICE VISIT (OUTPATIENT)
Dept: INTERNAL MEDICINE | Facility: CLINIC | Age: 80
End: 2020-06-16

## 2020-06-16 VITALS
BODY MASS INDEX: 32.32 KG/M2 | HEIGHT: 58 IN | SYSTOLIC BLOOD PRESSURE: 124 MMHG | HEART RATE: 72 BPM | WEIGHT: 154 LBS | TEMPERATURE: 97.7 F | DIASTOLIC BLOOD PRESSURE: 80 MMHG

## 2020-06-16 DIAGNOSIS — K59.1 FUNCTIONAL DIARRHEA: ICD-10-CM

## 2020-06-16 DIAGNOSIS — G30.1 LATE ONSET ALZHEIMER'S DISEASE WITHOUT BEHAVIORAL DISTURBANCE (HCC): ICD-10-CM

## 2020-06-16 DIAGNOSIS — E03.9 HYPOTHYROIDISM (ACQUIRED): ICD-10-CM

## 2020-06-16 DIAGNOSIS — I10 BENIGN ESSENTIAL HYPERTENSION: ICD-10-CM

## 2020-06-16 DIAGNOSIS — E55.9 VITAMIN D DEFICIENCY: ICD-10-CM

## 2020-06-16 DIAGNOSIS — R45.86 MOOD SWINGS: Primary | ICD-10-CM

## 2020-06-16 DIAGNOSIS — R82.90 ABNORMAL URINALYSIS: ICD-10-CM

## 2020-06-16 DIAGNOSIS — R45.86 MOOD SWINGS: ICD-10-CM

## 2020-06-16 DIAGNOSIS — F02.80 LATE ONSET ALZHEIMER'S DISEASE WITHOUT BEHAVIORAL DISTURBANCE (HCC): ICD-10-CM

## 2020-06-16 LAB
BILIRUB BLD-MCNC: ABNORMAL MG/DL
CLARITY, POC: ABNORMAL
COLOR UR: ABNORMAL
GLUCOSE UR STRIP-MCNC: NEGATIVE MG/DL
KETONES UR QL: ABNORMAL
LEUKOCYTE EST, POC: ABNORMAL
NITRITE UR-MCNC: NEGATIVE MG/ML
PH UR: 5.5 [PH] (ref 5–8)
POC CREATININE URINE: NORMAL
POC MICROALBUMIN URINE: NORMAL
PROT UR STRIP-MCNC: ABNORMAL MG/DL
RBC # UR STRIP: NEGATIVE /UL
SP GR UR: 1.02 (ref 1–1.03)
T4 FREE SERPL-MCNC: 1.31 NG/DL (ref 0.93–1.7)
TSH SERPL DL<=0.05 MIU/L-ACNC: 2.17 UIU/ML (ref 0.27–4.2)
UROBILINOGEN UR QL: NORMAL

## 2020-06-16 PROCEDURE — 84439 ASSAY OF FREE THYROXINE: CPT

## 2020-06-16 PROCEDURE — 84443 ASSAY THYROID STIM HORMONE: CPT

## 2020-06-16 PROCEDURE — 80053 COMPREHEN METABOLIC PANEL: CPT

## 2020-06-16 PROCEDURE — 82306 VITAMIN D 25 HYDROXY: CPT

## 2020-06-16 PROCEDURE — 81003 URINALYSIS AUTO W/O SCOPE: CPT | Performed by: INTERNAL MEDICINE

## 2020-06-16 PROCEDURE — 82607 VITAMIN B-12: CPT

## 2020-06-16 PROCEDURE — 87086 URINE CULTURE/COLONY COUNT: CPT

## 2020-06-16 PROCEDURE — 82044 UR ALBUMIN SEMIQUANTITATIVE: CPT | Performed by: INTERNAL MEDICINE

## 2020-06-16 PROCEDURE — 99214 OFFICE O/P EST MOD 30 MIN: CPT | Performed by: INTERNAL MEDICINE

## 2020-06-16 PROCEDURE — 85025 COMPLETE CBC W/AUTO DIFF WBC: CPT

## 2020-06-16 RX ORDER — AMLODIPINE BESYLATE 2.5 MG/1
2.5 TABLET ORAL DAILY
Qty: 30 TABLET | Refills: 11 | Status: SHIPPED | OUTPATIENT
Start: 2020-06-16 | End: 2021-01-28 | Stop reason: SDUPTHER

## 2020-06-16 RX ORDER — ESCITALOPRAM OXALATE 20 MG/1
10 TABLET ORAL DAILY
Qty: 30 TABLET | Refills: 11 | Status: SHIPPED | OUTPATIENT
Start: 2020-06-16 | End: 2021-01-28 | Stop reason: SDUPTHER

## 2020-06-16 RX ORDER — LOSARTAN POTASSIUM 100 MG/1
100 TABLET ORAL DAILY
Qty: 90 TABLET | Refills: 3 | Status: SHIPPED | OUTPATIENT
Start: 2020-06-16 | End: 2021-01-28 | Stop reason: SDUPTHER

## 2020-06-16 RX ORDER — DONEPEZIL HYDROCHLORIDE 10 MG/1
10 TABLET, FILM COATED ORAL NIGHTLY
Qty: 30 TABLET | Refills: 11 | Status: SHIPPED | OUTPATIENT
Start: 2020-06-16 | End: 2020-08-28

## 2020-06-16 RX ORDER — MONTELUKAST SODIUM 4 MG/1
1 TABLET, CHEWABLE ORAL DAILY
Qty: 30 TABLET | Refills: 0 | Status: SHIPPED | OUTPATIENT
Start: 2020-06-16 | End: 2021-01-28 | Stop reason: SDUPTHER

## 2020-06-16 RX ORDER — MEMANTINE HYDROCHLORIDE 10 MG/1
10 TABLET ORAL 2 TIMES DAILY
Qty: 60 TABLET | Refills: 11 | Status: SHIPPED | OUTPATIENT
Start: 2020-06-16 | End: 2021-01-28 | Stop reason: SDUPTHER

## 2020-06-16 RX ORDER — QUETIAPINE FUMARATE 25 MG/1
25 TABLET, FILM COATED ORAL NIGHTLY
Qty: 30 TABLET | Refills: 2 | Status: SHIPPED | OUTPATIENT
Start: 2020-06-16 | End: 2020-08-07

## 2020-06-16 NOTE — PROGRESS NOTES
Spoke with Arlene robert Wolcott and the pt is not taking the potassium and hasn't for a long time. Everything else is correct

## 2020-06-16 NOTE — PROGRESS NOTES
[facility]     Julieta King  1940   [id number]      Patient Care Team:  Mary Jane Goss MD as PCP - General  Mary Jane Goss MD as PCP - Family Medicine    Chief Complaint   Patient presents with   • Urinary Tract Infection     assisted living request [facility] because of mood swings   • Med Review      Patient is accompanied by her son Manuel King  who lives in [place].      HPI  Patient is a 80 y.o. female presents with mood swings. Denies burning or frequency.  No fever or chills.  Manuel reports that she has become more agitated and her mood swings have become worse over the last few weeks.  Assisted living called him to come off and see what was going on.    Urinalysis shows some bilirubin, leukocyte esterase, and protein.      CHRONIC CONDITIONS  Dementia- at assisted living. Has attendant to go in when she showers 2-3 times a week. [facility] prepares her med box and attendant passes meds. Son says she sleeps a lot more than she used to do. Memory worsening per son. Nurses at [facility] feel she has been more agitated. Patient denies any trouble.  She denies fatigue, insomnia, abdominal pain, heartburn, chest pain or dyspnea,loss of appetite.  There is apparently no trouble with dressing or eating or toileting.    Blood pressure is well controlled today.    Patient denies diarrhea.  She is on colestid twice a day.    Past Medical History:   Diagnosis Date   • Benign essential hypertension    • Bilateral hip joint arthritis    • Cataract    • GERD without esophagitis    • Hyperlipidemia    • Hypertension    • Hyponatremia     metabolic encephalopathy/viral bronchitis/dehydration/abnormal liver enzymes   • Hypothyroidism (acquired)    • Memory deficit    • Mixed hyperlipidemia    • Osteoarthritis    • Osteopenia of multiple sites    • Ovarian cyst      and uterine fibroids   • Seborrheic keratoses    • Vitamin D deficiency        Past Surgical History:   Procedure Laterality Date   •  BLEPHAROPLASTY Bilateral 2007   • TOTAL HIP ARTHROPLASTY Bilateral 1992   • TOTAL HIP ARTHROPLASTY REVISION Right 01/28/2013   • TOTAL HIP ARTHROPLASTY REVISION Left 2012       Family History   Problem Relation Age of Onset   • Dementia Father    • Diabetes Father    • Coronary artery disease Father    • Hypertension Father    • Coronary artery disease Mother    • Hypertension Mother    • Stroke Mother         hemorrhagic after TPA   • Coronary artery disease Paternal Uncle    • Ovarian cancer Maternal Grandmother    • Uterine cancer Maternal Grandmother        Social History     Socioeconomic History   • Marital status:      Spouse name: Not on file   • Number of children: Not on file   • Years of education: Not on file   • Highest education level: Not on file   Tobacco Use   • Smoking status: Never Smoker   • Smokeless tobacco: Never Used   Substance and Sexual Activity   • Alcohol use: Yes     Comment: rarely   • Drug use: No   • Sexual activity: Defer       Allergies   Allergen Reactions   • Lisinopril Cough       Review of Systems:     Review of Systems   Constitutional: Negative for chills, fatigue and fever.   HENT: Negative for sore throat and swollen glands.    Respiratory: Negative for cough, shortness of breath and wheezing.    Cardiovascular: Negative for chest pain, palpitations and leg swelling.   Gastrointestinal: Negative for abdominal pain, blood in stool, constipation, diarrhea and GERD.   Genitourinary: Negative for dysuria and frequency.   Musculoskeletal: Negative for arthralgias and back pain.   Neurological: Negative for dizziness, speech difficulty, weakness, numbness and headache.   Hematological: Negative for adenopathy. Does not bruise/bleed easily.   Psychiatric/Behavioral: Positive for agitation. Negative for hallucinations, self-injury, sleep disturbance, suicidal ideas, negative for hyperactivity and depressed mood. The patient is not nervous/anxious.        Vital  "Signs  Vitals:    06/16/20 1141   BP: 124/80   BP Location: Right arm   Patient Position: Sitting   Cuff Size: Adult   Pulse: 72   Temp: 97.7 °F (36.5 °C)   TempSrc: Infrared   Weight: 69.9 kg (154 lb)   Height: 148 cm (58.25\")   PainSc: 0-No pain     Body mass index is 31.91 kg/m².      Current Outpatient Medications:   •  acetaminophen (TYLENOL) 650 MG 8 hr tablet, Take 650 mg by mouth Every 8 (Eight) Hours As Needed for Mild Pain ., Disp: , Rfl:   •  amLODIPine (NORVASC) 2.5 MG tablet, Take 1 tablet by mouth Daily., Disp: 30 tablet, Rfl: 11  •  calcium carbonate (OS-SONIDO) 600 MG tablet, Take 600 mg by mouth Daily., Disp: , Rfl:   •  colestipol (COLESTID) 1 g tablet, Take 1 tablet by mouth Daily., Disp: 30 tablet, Rfl: 0  •  Cyanocobalamin (VITAMIN B-12) 500 MCG lozenge, Take  by mouth., Disp: , Rfl:   •  donepezil (ARICEPT) 10 MG tablet, Take 1 tablet by mouth Every Night., Disp: 30 tablet, Rfl: 11  •  escitalopram (LEXAPRO) 20 MG tablet, Take 0.5 tablets by mouth Daily., Disp: 30 tablet, Rfl: 11  •  levothyroxine (SYNTHROID, LEVOTHROID) 50 MCG tablet, Take 1 tablet by mouth Daily., Disp: 30 tablet, Rfl: 0  •  losartan (COZAAR) 100 MG tablet, Take 1 tablet by mouth Daily., Disp: 90 tablet, Rfl: 3  •  memantine (NAMENDA) 10 MG tablet, Take 1 tablet by mouth 2 (Two) Times a Day., Disp: 60 tablet, Rfl: 11  •  potassium chloride (K-DUR) 10 MEQ CR tablet, , Disp: , Rfl:   •  Probiotic Product (PROBIOTIC DAILY PO), Take  by mouth., Disp: , Rfl:   •  Vitamin D, Cholecalciferol, (CHOLECALCIFEROL) 400 units tablet, Take 400 Units by mouth Daily., Disp: , Rfl:   •  QUEtiapine (SEROquel) 25 MG tablet, Take 1 tablet by mouth Every Night., Disp: 30 tablet, Rfl: 2    Physical Exam:    Physical Exam   Constitutional: She is oriented to person, place, and time. She appears well-developed and well-nourished.   HENT:   Head: Normocephalic.   Eyes: Pupils are equal, round, and reactive to light. Conjunctivae and EOM are normal. "   Neck: Normal range of motion. Neck supple. No thyromegaly present.   Cardiovascular: Normal rate, regular rhythm, normal heart sounds and intact distal pulses.   Pulmonary/Chest: Effort normal and breath sounds normal.   Musculoskeletal: Normal range of motion. She exhibits no edema.   Lymphadenopathy:     She has no cervical adenopathy.   Neurological: She is alert and oriented to person, place, and time. She has normal strength. No cranial nerve deficit. Gait normal.   Psychiatric: Her speech is normal. Thought content normal. Her affect is angry and labile. She is agitated. Thought content is not paranoid and not delusional. She expresses impulsivity. She expresses no homicidal and no suicidal ideation. She exhibits abnormal recent memory and abnormal remote memory.   Nursing note and vitals reviewed.       ACE III MINI        Results Review:    None    CMP:  Lab Results   Component Value Date    BUN 15 02/06/2019    CREATININE 0.72 02/06/2019    EGFRIFNONA 78 02/06/2019    BCR 20.8 02/06/2019     02/06/2019    K 4.4 02/06/2019    CO2 24.0 02/06/2019    CALCIUM 10.4 02/06/2019    ALBUMIN 4.68 02/06/2019    BILITOT 0.6 02/06/2019    ALKPHOS 89 02/06/2019    AST 16 02/06/2019    ALT 8 02/06/2019     HbA1c:  No results found for: HGBA1C  Microalbumin:  Lab Results   Component Value Date    POCMALB 80mg/L 06/16/2020    POCCREAT 300mg/dL 06/16/2020     Lipid Panel  Lab Results   Component Value Date    AST 16 02/06/2019    ALT 8 02/06/2019       Medication Review: Medications reviewed and noted  Patient Instructions   Problem List Items Addressed This Visit        Cardiovascular and Mediastinum    Benign essential hypertension    Overview     6/16/2020 Mary Jane Goss MD    Continue amlodipine daily, losartan daily.         Relevant Medications    amLODIPine (NORVASC) 2.5 MG tablet    losartan (COZAAR) 100 MG tablet    Other Relevant Orders    POC Urinalysis Dipstick, Automated (Completed)    POC  Microalbumin (Completed)    CBC & Differential    Comprehensive Metabolic Panel       Digestive    Vitamin D deficiency    Overview     6/16/2020 Mary Jane Goss MD    Recheck blood level today.         Relevant Orders    Vitamin D 25 Hydroxy    Functional diarrhea    Overview     6/16/2020 Mary Jane Goss MD    Decrease Colestid to once a day for 30 days then stop. If she developes loose stools, we can resume it.             Endocrine    Hypothyroidism (acquired)    Overview     6/16/2020 Mary Jane Goss MD    Recheck blood levels today.         Relevant Medications    levothyroxine (SYNTHROID, LEVOTHROID) 50 MCG tablet    Other Relevant Orders    TSH    T4, Free       Nervous and Auditory    Late onset Alzheimer's disease without behavioral disturbance (CMS/HCC)    Overview     6/16/2020 Mary Jane Goss MD    Continue memantine twice a day. Continue donepezil daily.    Add quetiapine in the evenings.         Relevant Medications    escitalopram (LEXAPRO) 20 MG tablet    QUEtiapine (SEROquel) 25 MG tablet    donepezil (ARICEPT) 10 MG tablet    memantine (NAMENDA) 10 MG tablet       Other    Mood swings - Primary    Relevant Orders    POC Urinalysis Dipstick, Automated (Completed)    Vitamin B12    Abnormal urinalysis    Relevant Orders    Urine Culture - Urine, Urine, Clean Catch             Diagnosis Plan   1. Mood swings  POC Urinalysis Dipstick, Automated    Vitamin B12   2. Benign essential hypertension  POC Urinalysis Dipstick, Automated    POC Microalbumin    CBC & Differential    Comprehensive Metabolic Panel    amLODIPine (NORVASC) 2.5 MG tablet   3. Late onset Alzheimer's disease without behavioral disturbance (CMS/HCC)     4. Abnormal urinalysis  Urine Culture - Urine, Urine, Clean Catch   5. Hypothyroidism (acquired)  TSH    T4, Free   6. Functional diarrhea     7. Vitamin D deficiency  Vitamin D 25 Hydroxy       Plan of care reviewed with patient at the conclusion of today's visit. Education was  provided regarding diagnosis, management, and any prescribed or recommended OTC medications.Patient verbalizes understanding of and agreement with management plan.         Mary Jane Goss MD

## 2020-06-16 NOTE — PATIENT INSTRUCTIONS
Patient Instructions   Problem List Items Addressed This Visit        Cardiovascular and Mediastinum    Benign essential hypertension    Overview     6/16/2020 Mary Jane Goss MD    Continue amlodipine daily, losartan daily.         Relevant Medications    amLODIPine (NORVASC) 2.5 MG tablet    losartan (COZAAR) 100 MG tablet    Other Relevant Orders    POC Urinalysis Dipstick, Automated (Completed)    POC Microalbumin (Completed)    CBC & Differential    Comprehensive Metabolic Panel       Digestive    Vitamin D deficiency    Overview     6/16/2020 Mary Jane Goss MD    Recheck blood level today.         Relevant Orders    Vitamin D 25 Hydroxy    Functional diarrhea    Overview     6/16/2020 Mary Jane Goss MD    Decrease Colestid to once a day for 30 days then stop. If she developes loose stools, we can resume it.             Endocrine    Hypothyroidism (acquired)    Overview     6/16/2020 Mary Jane Goss MD    Recheck blood levels today.         Relevant Medications    levothyroxine (SYNTHROID, LEVOTHROID) 50 MCG tablet    Other Relevant Orders    TSH    T4, Free       Nervous and Auditory    Late onset Alzheimer's disease without behavioral disturbance (CMS/HCC)    Overview     6/16/2020 Mary Jane Goss MD    Continue memantine twice a day. Continue donepezil daily.    Add quetiapine in the evenings.         Relevant Medications    escitalopram (LEXAPRO) 20 MG tablet    QUEtiapine (SEROquel) 25 MG tablet    donepezil (ARICEPT) 10 MG tablet    memantine (NAMENDA) 10 MG tablet       Other    Mood swings - Primary    Relevant Orders    POC Urinalysis Dipstick, Automated (Completed)    Vitamin B12    Abnormal urinalysis    Relevant Orders    Urine Culture - Urine, Urine, Clean Catch             Dementia Caregiver Guide  Dementia is a term used to describe a number of symptoms that affect memory and thinking. The most common symptoms include:  · Memory loss.  · Trouble with language and  communication.  · Trouble concentrating.  · Poor judgment.  · Problems with reasoning.  · Child-like behavior and language.  · Extreme anxiety.  · Angry outbursts.  · Wandering from home or public places.  Dementia usually gets worse slowly over time. In the early stages, people with dementia can stay independent and safe with some help. In later stages, they need help with daily tasks such as dressing, grooming, and using the bathroom.  How to help the person with dementia cope  Dementia can be frightening and confusing. Here are some tips to help the person with dementia cope with changes caused by the disease.  General tips  · Keep the person on track with his or her routine.  · Try to identify areas where the person may need help.  · Be supportive, patient, calm, and encouraging.  · Gently remind the person that adjusting to changes takes time.  · Help with the tasks that the person has asked for help with.  · Keep the person involved in daily tasks and decisions as much as possible.  · Encourage conversation, but try not to get frustrated or harried if the person struggles to find words or does not seem to appreciate your help.  Communication tips  · When the person is talking or seems frustrated, make eye contact and hold the person's hand.  · Ask specific questions that need yes or no answers.  · Use simple words, short sentences, and a calm voice. Only give one direction at a time.  · When offering choices, limit them to just 1 or 2.  · Avoid correcting the person in a negative way.  · If the person is struggling to find the right words, gently try to help him or her.  How to recognize symptoms of stress  Symptoms of stress in caregivers include:  · Feeling frustrated or angry with the person with dementia.  · Denying that the person has dementia or that his or her symptoms will not improve.  · Feeling hopeless and unappreciated.  · Difficulty sleeping.  · Difficulty concentrating.  · Feeling anxious,  irritable, or depressed.  · Developing stress-related health problems.  · Feeling like you have too little time for your own life.  Follow these instructions at home:    · Make sure that you and the person you are caring for:  ? Get regular sleep.  ? Exercise regularly.  ? Eat regular, nutritious meals.  ? Drink enough fluid to keep your urine clear or pale yellow.  ? Take over-the-counter and prescription medicines only as told by your health care providers.  ? Attend all scheduled health care appointments.  · Join a support group with others who are caregivers.  · Ask about respite care resources so that you can have a regular break from the stress of caregiving.  · Look for signs of stress in yourself and in the person you are caring for. If you notice signs of stress, take steps to manage it.  · Consider any safety risks and take steps to avoid them.  · Organize medications in a pill box for each day of the week.  · Create a plan to handle any legal or financial matters. Get legal or financial advice if needed.  · Keep a calendar in a central location to remind the person of appointments or other activities.  Tips for reducing the risk of injury  · Keep floors clear of clutter. Remove rugs, magazine racks, and floor lamps.  · Keep hallways well lit, especially at night.  · Put a handrail and nonslip mat in the bathtub or shower.  · Put childproof locks on cabinets that contain dangerous items, such as medicines, alcohol, guns, toxic cleaning items, sharp tools or utensils, matches, and lighters.  · Put the locks in places where the person cannot see or reach them easily. This will help ensure that the person does not wander out of the house and get lost.  · Be prepared for emergencies. Keep a list of emergency phone numbers and addresses in a convenient area.  · Remove car keys and lock garage doors so that the person does not try to get in the car and drive.  · Have the person wear a bracelet that tracks  locations and identifies the person as having memory problems. This should be worn at all times for safety.  Where to find support:  Many individuals and organizations offer support. These include:  · Support groups for people with dementia and for caregivers.  · Counselors or therapists.  · Home health care services.  · Adult day care centers.  Where to find more information  Alzheimer[employer] Association: www.alz.org  Contact a health care provider if:  · The person's health is rapidly getting worse.  · You are no longer able to care for the person.  · Caring for the person is affecting your physical and emotional health.  · The person threatens himself or herself, you, or anyone else.  Summary  · Dementia is a term used to describe a number of symptoms that affect memory and thinking.  · Dementia usually gets worse slowly over time.  · Take steps to reduce the person's risk of injury, and to plan for future care.  · Caregivers need support, relief from caregiving, and time for their own lives.  This information is not intended to replace advice given to you by your health care provider. Make sure you discuss any questions you have with your health care provider.  Document Released: 11/21/2017 Document Revised: 11/30/2018 Document Reviewed: 11/21/2017  Elsevier Patient Education © 2020 [employer]

## 2020-06-17 LAB
25(OH)D3 SERPL-MCNC: 64.4 NG/ML (ref 30–100)
ALBUMIN SERPL-MCNC: 4.6 G/DL (ref 3.5–5.2)
ALBUMIN/GLOB SERPL: 2 G/DL
ALP SERPL-CCNC: 77 U/L (ref 39–117)
ALT SERPL W P-5'-P-CCNC: 9 U/L (ref 1–33)
ANION GAP SERPL CALCULATED.3IONS-SCNC: 13.4 MMOL/L (ref 5–15)
AST SERPL-CCNC: 13 U/L (ref 1–32)
BACTERIA SPEC AEROBE CULT: NO GROWTH
BASOPHILS # BLD AUTO: 0.07 10*3/MM3 (ref 0–0.2)
BASOPHILS NFR BLD AUTO: 1 % (ref 0–1.5)
BILIRUB SERPL-MCNC: 0.7 MG/DL (ref 0.2–1.2)
BUN BLD-MCNC: 12 MG/DL (ref 8–23)
BUN/CREAT SERPL: 12.2 (ref 7–25)
CALCIUM SPEC-SCNC: 10.6 MG/DL (ref 8.6–10.5)
CHLORIDE SERPL-SCNC: 99 MMOL/L (ref 98–107)
CO2 SERPL-SCNC: 24.6 MMOL/L (ref 22–29)
CREAT BLD-MCNC: 0.98 MG/DL (ref 0.57–1)
DEPRECATED RDW RBC AUTO: 48 FL (ref 37–54)
EOSINOPHIL # BLD AUTO: 0.12 10*3/MM3 (ref 0–0.4)
EOSINOPHIL NFR BLD AUTO: 1.6 % (ref 0.3–6.2)
ERYTHROCYTE [DISTWIDTH] IN BLOOD BY AUTOMATED COUNT: 13.7 % (ref 12.3–15.4)
GFR SERPL CREATININE-BSD FRML MDRD: 54 ML/MIN/1.73
GLOBULIN UR ELPH-MCNC: 2.3 GM/DL
GLUCOSE BLD-MCNC: 96 MG/DL (ref 65–99)
HCT VFR BLD AUTO: 41.8 % (ref 34–46.6)
HGB BLD-MCNC: 13.9 G/DL (ref 12–15.9)
IMM GRANULOCYTES # BLD AUTO: 0.01 10*3/MM3 (ref 0–0.05)
IMM GRANULOCYTES NFR BLD AUTO: 0.1 % (ref 0–0.5)
LYMPHOCYTES # BLD AUTO: 1.36 10*3/MM3 (ref 0.7–3.1)
LYMPHOCYTES NFR BLD AUTO: 18.6 % (ref 19.6–45.3)
MCH RBC QN AUTO: 31.5 PG (ref 26.6–33)
MCHC RBC AUTO-ENTMCNC: 33.3 G/DL (ref 31.5–35.7)
MCV RBC AUTO: 94.8 FL (ref 79–97)
MONOCYTES # BLD AUTO: 0.75 10*3/MM3 (ref 0.1–0.9)
MONOCYTES NFR BLD AUTO: 10.3 % (ref 5–12)
NEUTROPHILS # BLD AUTO: 5 10*3/MM3 (ref 1.7–7)
NEUTROPHILS NFR BLD AUTO: 68.4 % (ref 42.7–76)
NRBC BLD AUTO-RTO: 0 /100 WBC (ref 0–0.2)
PLATELET # BLD AUTO: 277 10*3/MM3 (ref 140–450)
PMV BLD AUTO: 10.6 FL (ref 6–12)
POTASSIUM BLD-SCNC: 4 MMOL/L (ref 3.5–5.2)
PROT SERPL-MCNC: 6.9 G/DL (ref 6–8.5)
RBC # BLD AUTO: 4.41 10*6/MM3 (ref 3.77–5.28)
SODIUM BLD-SCNC: 137 MMOL/L (ref 136–145)
VIT B12 BLD-MCNC: 1441 PG/ML (ref 211–946)
WBC NRBC COR # BLD: 7.31 10*3/MM3 (ref 3.4–10.8)

## 2020-06-18 ENCOUNTER — TELEPHONE (OUTPATIENT)
Dept: INTERNAL MEDICINE | Facility: CLINIC | Age: 80
End: 2020-06-18

## 2020-06-18 NOTE — TELEPHONE ENCOUNTER
Please call patient's son Manuel to go over the lab letter I just printed.    Call her pharmacy that fixes her pillboxes (?  [facility]) to go over the 4 medications we are stopping and make sure we have her current medication list right.

## 2020-06-18 NOTE — TELEPHONE ENCOUNTER
LM for pt son, Manuel, to call back.  **Also LM for Arlene robert Dinosaur letting her know what meds we are stopping and advising to call back with any questions.

## 2020-06-26 RX ORDER — DONEPEZIL HYDROCHLORIDE 10 MG/1
TABLET, FILM COATED ORAL
Qty: 30 TABLET | Refills: 1 | OUTPATIENT
Start: 2020-06-26

## 2020-08-07 RX ORDER — QUETIAPINE FUMARATE 25 MG/1
25 TABLET, FILM COATED ORAL NIGHTLY
Qty: 30 TABLET | Refills: 2 | Status: SHIPPED | OUTPATIENT
Start: 2020-08-07 | End: 2020-12-03

## 2020-08-28 RX ORDER — DONEPEZIL HYDROCHLORIDE 10 MG/1
TABLET, FILM COATED ORAL
Qty: 30 TABLET | Refills: 1 | Status: SHIPPED | OUTPATIENT
Start: 2020-08-28 | End: 2021-01-28 | Stop reason: SDUPTHER

## 2020-09-13 ENCOUNTER — HOSPITAL ENCOUNTER (EMERGENCY)
Facility: HOSPITAL | Age: 80
Discharge: NURSING, REHAB OR LONG-TERM CARE | End: 2020-09-13
Attending: EMERGENCY MEDICINE | Admitting: EMERGENCY MEDICINE

## 2020-09-13 VITALS
DIASTOLIC BLOOD PRESSURE: 66 MMHG | OXYGEN SATURATION: 98 % | HEIGHT: 62 IN | TEMPERATURE: 98.3 F | RESPIRATION RATE: 18 BRPM | WEIGHT: 160 LBS | SYSTOLIC BLOOD PRESSURE: 119 MMHG | HEART RATE: 62 BPM | BODY MASS INDEX: 29.44 KG/M2

## 2020-09-13 DIAGNOSIS — R55 SYNCOPE AND COLLAPSE: Primary | ICD-10-CM

## 2020-09-13 LAB
ALBUMIN SERPL-MCNC: 4.2 G/DL (ref 3.5–5.2)
ALBUMIN/GLOB SERPL: 1.9 G/DL
ALP SERPL-CCNC: 98 U/L (ref 39–117)
ALT SERPL W P-5'-P-CCNC: 6 U/L (ref 1–33)
ANION GAP SERPL CALCULATED.3IONS-SCNC: 11 MMOL/L (ref 5–15)
AST SERPL-CCNC: 15 U/L (ref 1–32)
BASOPHILS # BLD AUTO: 0.05 10*3/MM3 (ref 0–0.2)
BASOPHILS NFR BLD AUTO: 0.8 % (ref 0–1.5)
BILIRUB SERPL-MCNC: 0.6 MG/DL (ref 0–1.2)
BILIRUB UR QL STRIP: NEGATIVE
BUN SERPL-MCNC: 16 MG/DL (ref 8–23)
BUN/CREAT SERPL: 18.2 (ref 7–25)
CALCIUM SPEC-SCNC: 10.3 MG/DL (ref 8.6–10.5)
CHLORIDE SERPL-SCNC: 103 MMOL/L (ref 98–107)
CLARITY UR: CLEAR
CO2 SERPL-SCNC: 25 MMOL/L (ref 22–29)
COLOR UR: YELLOW
CREAT SERPL-MCNC: 0.88 MG/DL (ref 0.57–1)
D-LACTATE SERPL-SCNC: 1.4 MMOL/L (ref 0.5–2)
DEPRECATED RDW RBC AUTO: 46.5 FL (ref 37–54)
EOSINOPHIL # BLD AUTO: 0.15 10*3/MM3 (ref 0–0.4)
EOSINOPHIL NFR BLD AUTO: 2.4 % (ref 0.3–6.2)
ERYTHROCYTE [DISTWIDTH] IN BLOOD BY AUTOMATED COUNT: 12.5 % (ref 12.3–15.4)
GFR SERPL CREATININE-BSD FRML MDRD: 62 ML/MIN/1.73
GLOBULIN UR ELPH-MCNC: 2.2 GM/DL
GLUCOSE SERPL-MCNC: 101 MG/DL (ref 65–99)
GLUCOSE UR STRIP-MCNC: NEGATIVE MG/DL
HCT VFR BLD AUTO: 42.9 % (ref 34–46.6)
HGB BLD-MCNC: 13.7 G/DL (ref 12–15.9)
HGB UR QL STRIP.AUTO: NEGATIVE
HOLD SPECIMEN: NORMAL
HOLD SPECIMEN: NORMAL
IMM GRANULOCYTES # BLD AUTO: 0.01 10*3/MM3 (ref 0–0.05)
IMM GRANULOCYTES NFR BLD AUTO: 0.2 % (ref 0–0.5)
KETONES UR QL STRIP: ABNORMAL
LEUKOCYTE ESTERASE UR QL STRIP.AUTO: NEGATIVE
LYMPHOCYTES # BLD AUTO: 1.06 10*3/MM3 (ref 0.7–3.1)
LYMPHOCYTES NFR BLD AUTO: 17.1 % (ref 19.6–45.3)
MCH RBC QN AUTO: 32 PG (ref 26.6–33)
MCHC RBC AUTO-ENTMCNC: 31.9 G/DL (ref 31.5–35.7)
MCV RBC AUTO: 100.2 FL (ref 79–97)
MONOCYTES # BLD AUTO: 0.52 10*3/MM3 (ref 0.1–0.9)
MONOCYTES NFR BLD AUTO: 8.4 % (ref 5–12)
NEUTROPHILS NFR BLD AUTO: 4.42 10*3/MM3 (ref 1.7–7)
NEUTROPHILS NFR BLD AUTO: 71.1 % (ref 42.7–76)
NITRITE UR QL STRIP: NEGATIVE
NRBC BLD AUTO-RTO: 0 /100 WBC (ref 0–0.2)
PH UR STRIP.AUTO: <=5 [PH] (ref 5–8)
PLATELET # BLD AUTO: 234 10*3/MM3 (ref 140–450)
PMV BLD AUTO: 10.3 FL (ref 6–12)
POTASSIUM SERPL-SCNC: 4.5 MMOL/L (ref 3.5–5.2)
PROCALCITONIN SERPL-MCNC: 0.03 NG/ML (ref 0–0.25)
PROT SERPL-MCNC: 6.4 G/DL (ref 6–8.5)
PROT UR QL STRIP: NEGATIVE
RBC # BLD AUTO: 4.28 10*6/MM3 (ref 3.77–5.28)
SODIUM SERPL-SCNC: 139 MMOL/L (ref 136–145)
SP GR UR STRIP: >=1.03 (ref 1–1.03)
TROPONIN T SERPL-MCNC: <0.01 NG/ML (ref 0–0.03)
UROBILINOGEN UR QL STRIP: ABNORMAL
WBC # BLD AUTO: 6.21 10*3/MM3 (ref 3.4–10.8)
WHOLE BLOOD HOLD SPECIMEN: NORMAL
WHOLE BLOOD HOLD SPECIMEN: NORMAL

## 2020-09-13 PROCEDURE — 83605 ASSAY OF LACTIC ACID: CPT | Performed by: EMERGENCY MEDICINE

## 2020-09-13 PROCEDURE — 84145 PROCALCITONIN (PCT): CPT | Performed by: EMERGENCY MEDICINE

## 2020-09-13 PROCEDURE — 84484 ASSAY OF TROPONIN QUANT: CPT | Performed by: EMERGENCY MEDICINE

## 2020-09-13 PROCEDURE — 99284 EMERGENCY DEPT VISIT MOD MDM: CPT

## 2020-09-13 PROCEDURE — 80053 COMPREHEN METABOLIC PANEL: CPT | Performed by: EMERGENCY MEDICINE

## 2020-09-13 PROCEDURE — 93005 ELECTROCARDIOGRAM TRACING: CPT | Performed by: EMERGENCY MEDICINE

## 2020-09-13 PROCEDURE — P9612 CATHETERIZE FOR URINE SPEC: HCPCS

## 2020-09-13 PROCEDURE — 81003 URINALYSIS AUTO W/O SCOPE: CPT | Performed by: EMERGENCY MEDICINE

## 2020-09-13 PROCEDURE — 85025 COMPLETE CBC W/AUTO DIFF WBC: CPT | Performed by: EMERGENCY MEDICINE

## 2020-09-13 RX ORDER — SODIUM CHLORIDE 0.9 % (FLUSH) 0.9 %
10 SYRINGE (ML) INJECTION AS NEEDED
Status: DISCONTINUED | OUTPATIENT
Start: 2020-09-13 | End: 2020-09-14 | Stop reason: HOSPADM

## 2020-09-14 NOTE — ED PROVIDER NOTES
Subjective   Pt presents after a syncopal episode.  She reportedly became pale and then fainted at the NH.  Pt has dementia which limits history.  She denies fainting at all and is upset with being here.  She says she has felt at baseline all day today and denies chest pain, palpitations, dyspnea, fever, cough, sore throat, vomiting, diarrhea, myalgias or any other symptoms.      History provided by:  Patient and EMS personnel  History limited by:  Dementia      Review of Systems   Unable to perform ROS: Dementia (documented as patient answered and based on history provided but reliability is suspect)   Constitutional: Negative for fever.   HENT: Negative for sore throat.    Respiratory: Negative for shortness of breath.    Cardiovascular: Negative for chest pain and palpitations.   Gastrointestinal: Negative for abdominal pain, diarrhea and vomiting.   Genitourinary: Negative for difficulty urinating.   Musculoskeletal: Negative for back pain.   Skin: Negative for wound.   Neurological: Positive for syncope. Negative for dizziness and headaches.       Past Medical History:   Diagnosis Date   • Benign essential hypertension    • Bilateral hip joint arthritis    • Cataract    • GERD without esophagitis    • Hyperlipidemia    • Hypertension    • Hyponatremia     metabolic encephalopathy/viral bronchitis/dehydration/abnormal liver enzymes   • Hypothyroidism (acquired)    • Memory deficit    • Mixed hyperlipidemia    • Osteoarthritis    • Osteopenia of multiple sites    • Ovarian cyst      and uterine fibroids   • Seborrheic keratoses    • Vitamin D deficiency        Allergies   Allergen Reactions   • Lisinopril Cough       Past Surgical History:   Procedure Laterality Date   • BLEPHAROPLASTY Bilateral 2007   • TOTAL HIP ARTHROPLASTY Bilateral 1992   • TOTAL HIP ARTHROPLASTY REVISION Right 01/28/2013   • TOTAL HIP ARTHROPLASTY REVISION Left 2012       Family History   Problem Relation Age of Onset   • Dementia Father     • Diabetes Father    • Coronary artery disease Father    • Hypertension Father    • Coronary artery disease Mother    • Hypertension Mother    • Stroke Mother         hemorrhagic after TPA   • Coronary artery disease Paternal Uncle    • Ovarian cancer Maternal Grandmother    • Uterine cancer Maternal Grandmother        Social History     Socioeconomic History   • Marital status:      Spouse name: Not on file   • Number of children: Not on file   • Years of education: Not on file   • Highest education level: Not on file   Tobacco Use   • Smoking status: Never Smoker   • Smokeless tobacco: Never Used   Substance and Sexual Activity   • Alcohol use: Yes     Comment: rarely   • Drug use: No   • Sexual activity: Defer           Objective   Physical Exam  Vitals signs and nursing note reviewed.   Constitutional:       General: She is not in acute distress.     Appearance: Normal appearance. She is not ill-appearing.   HENT:      Head: Normocephalic and atraumatic.      Mouth/Throat:      Mouth: Mucous membranes are moist.   Eyes:      General: No scleral icterus.        Right eye: No discharge.         Left eye: No discharge.      Conjunctiva/sclera: Conjunctivae normal.   Neck:      Musculoskeletal: Normal range of motion and neck supple.   Cardiovascular:      Rate and Rhythm: Normal rate and regular rhythm.      Heart sounds: No murmur.   Pulmonary:      Effort: Pulmonary effort is normal. No respiratory distress.      Breath sounds: Normal breath sounds. No wheezing.   Abdominal:      General: Bowel sounds are normal. There is no distension.      Palpations: Abdomen is soft.      Tenderness: There is no abdominal tenderness. There is no guarding or rebound.   Musculoskeletal: Normal range of motion.         General: No swelling.   Skin:     General: Skin is warm and dry.      Findings: No rash.   Neurological:      General: No focal deficit present.      Mental Status: She is alert.      Comments: Demented,  requires reorienting.     Psychiatric:         Mood and Affect: Mood normal.         Behavior: Behavior normal.         Thought Content: Thought content normal.         Procedures           ED Course         EKG SB.  UA negative. Labs benign, sepsis markers normal.  Troponin undetectable x1 but it took 3 hours to obtain that specimen so past the 2 hour dickson for a delta troponin and is 0.  Pt without symptoms in ED.  She is upset at the length of stay and eager to get back to the NH. Discussed results with her.                                  MDM  Number of Diagnoses or Management Options  Syncope and collapse:      Amount and/or Complexity of Data Reviewed  Clinical lab tests: reviewed and ordered  Independent visualization of images, tracings, or specimens: yes        Final diagnoses:   Syncope and collapse            Mio Soto MD  09/13/20 1194

## 2020-12-03 RX ORDER — LEVOTHYROXINE SODIUM 0.05 MG/1
TABLET ORAL
Qty: 90 TABLET | Refills: 0 | Status: SHIPPED | OUTPATIENT
Start: 2020-12-03 | End: 2021-01-28 | Stop reason: SDUPTHER

## 2020-12-03 RX ORDER — QUETIAPINE FUMARATE 25 MG/1
25 TABLET, FILM COATED ORAL NIGHTLY
Qty: 90 TABLET | Refills: 0 | Status: SHIPPED | OUTPATIENT
Start: 2020-12-03 | End: 2021-01-20 | Stop reason: HOSPADM

## 2020-12-16 ENCOUNTER — TELEPHONE (OUTPATIENT)
Dept: INTERNAL MEDICINE | Facility: CLINIC | Age: 80
End: 2020-12-16

## 2020-12-16 NOTE — TELEPHONE ENCOUNTER
PATIENT'S SON RETURNED PHONE CALL TO JOHN WHO WAS IN A MEETING.    PATIENT HAD APPOINTMENT TODAY WITH DR. DIAS BUT DID NOT SHOW.  HE SAYS SHE IS IN [facility] AND HE IS IN [place].    NOT SURE WHAT SHE WAS CALLING ABOUT.

## 2020-12-23 ENCOUNTER — HOSPITAL ENCOUNTER (EMERGENCY)
Facility: HOSPITAL | Age: 80
Discharge: NURSING, REHAB OR LONG-TERM CARE | End: 2020-12-24
Attending: EMERGENCY MEDICINE | Admitting: EMERGENCY MEDICINE

## 2020-12-23 ENCOUNTER — APPOINTMENT (OUTPATIENT)
Dept: GENERAL RADIOLOGY | Facility: HOSPITAL | Age: 80
End: 2020-12-23

## 2020-12-23 ENCOUNTER — APPOINTMENT (OUTPATIENT)
Dept: CT IMAGING | Facility: HOSPITAL | Age: 80
End: 2020-12-23

## 2020-12-23 ENCOUNTER — HOSPITAL ENCOUNTER (EMERGENCY)
Facility: HOSPITAL | Age: 80
Discharge: NURSING, REHAB OR LONG-TERM CARE | End: 2020-12-23
Attending: EMERGENCY MEDICINE | Admitting: EMERGENCY MEDICINE

## 2020-12-23 VITALS
BODY MASS INDEX: 27.6 KG/M2 | TEMPERATURE: 98 F | HEART RATE: 62 BPM | HEIGHT: 62 IN | RESPIRATION RATE: 18 BRPM | WEIGHT: 150 LBS | DIASTOLIC BLOOD PRESSURE: 75 MMHG | SYSTOLIC BLOOD PRESSURE: 129 MMHG | OXYGEN SATURATION: 97 %

## 2020-12-23 DIAGNOSIS — F02.81 ALZHEIMER'S DEMENTIA WITH BEHAVIORAL DISTURBANCE, UNSPECIFIED TIMING OF DEMENTIA ONSET: ICD-10-CM

## 2020-12-23 DIAGNOSIS — G30.9 ALZHEIMER'S DEMENTIA WITH BEHAVIORAL DISTURBANCE, UNSPECIFIED TIMING OF DEMENTIA ONSET: ICD-10-CM

## 2020-12-23 DIAGNOSIS — E86.9 VOLUME DEPLETION: ICD-10-CM

## 2020-12-23 DIAGNOSIS — G30.1 LATE ONSET ALZHEIMER'S DISEASE WITHOUT BEHAVIORAL DISTURBANCE (HCC): ICD-10-CM

## 2020-12-23 DIAGNOSIS — F02.80 LATE ONSET ALZHEIMER'S DISEASE WITHOUT BEHAVIORAL DISTURBANCE (HCC): ICD-10-CM

## 2020-12-23 DIAGNOSIS — R29.6 UNWITNESSED FALL: Primary | ICD-10-CM

## 2020-12-23 DIAGNOSIS — I10 ELEVATED BLOOD PRESSURE READING WITH DIAGNOSIS OF HYPERTENSION: ICD-10-CM

## 2020-12-23 DIAGNOSIS — R55 SYNCOPE AND COLLAPSE: Primary | ICD-10-CM

## 2020-12-23 DIAGNOSIS — F03.C0 ADVANCED DEMENTIA (HCC): ICD-10-CM

## 2020-12-23 LAB
ALBUMIN SERPL-MCNC: 3.7 G/DL (ref 3.5–5.2)
ALBUMIN SERPL-MCNC: 4.2 G/DL (ref 3.5–5.2)
ALBUMIN/GLOB SERPL: 1.6 G/DL
ALBUMIN/GLOB SERPL: 1.6 G/DL
ALP SERPL-CCNC: 83 U/L (ref 39–117)
ALP SERPL-CCNC: 90 U/L (ref 39–117)
ALT SERPL W P-5'-P-CCNC: 8 U/L (ref 1–33)
ALT SERPL W P-5'-P-CCNC: 8 U/L (ref 1–33)
ANION GAP SERPL CALCULATED.3IONS-SCNC: 9 MMOL/L (ref 5–15)
ANION GAP SERPL CALCULATED.3IONS-SCNC: 9 MMOL/L (ref 5–15)
AST SERPL-CCNC: 12 U/L (ref 1–32)
AST SERPL-CCNC: 15 U/L (ref 1–32)
BASOPHILS # BLD AUTO: 0.04 10*3/MM3 (ref 0–0.2)
BASOPHILS # BLD AUTO: 0.06 10*3/MM3 (ref 0–0.2)
BASOPHILS NFR BLD AUTO: 0.6 % (ref 0–1.5)
BASOPHILS NFR BLD AUTO: 1.2 % (ref 0–1.5)
BILIRUB SERPL-MCNC: 0.6 MG/DL (ref 0–1.2)
BILIRUB SERPL-MCNC: 0.6 MG/DL (ref 0–1.2)
BUN SERPL-MCNC: 17 MG/DL (ref 8–23)
BUN SERPL-MCNC: 21 MG/DL (ref 8–23)
BUN/CREAT SERPL: 25.4 (ref 7–25)
BUN/CREAT SERPL: 30.9 (ref 7–25)
CALCIUM SPEC-SCNC: 10.3 MG/DL (ref 8.6–10.5)
CALCIUM SPEC-SCNC: 9.8 MG/DL (ref 8.6–10.5)
CHLORIDE SERPL-SCNC: 107 MMOL/L (ref 98–107)
CHLORIDE SERPL-SCNC: 107 MMOL/L (ref 98–107)
CK SERPL-CCNC: 22 U/L (ref 20–180)
CO2 SERPL-SCNC: 23 MMOL/L (ref 22–29)
CO2 SERPL-SCNC: 27 MMOL/L (ref 22–29)
CREAT SERPL-MCNC: 0.67 MG/DL (ref 0.57–1)
CREAT SERPL-MCNC: 0.68 MG/DL (ref 0.57–1)
D-LACTATE SERPL-SCNC: 0.9 MMOL/L (ref 0.5–2)
DEPRECATED RDW RBC AUTO: 45.1 FL (ref 37–54)
DEPRECATED RDW RBC AUTO: 45.1 FL (ref 37–54)
EOSINOPHIL # BLD AUTO: 0.08 10*3/MM3 (ref 0–0.4)
EOSINOPHIL # BLD AUTO: 0.17 10*3/MM3 (ref 0–0.4)
EOSINOPHIL NFR BLD AUTO: 1.2 % (ref 0.3–6.2)
EOSINOPHIL NFR BLD AUTO: 3.3 % (ref 0.3–6.2)
ERYTHROCYTE [DISTWIDTH] IN BLOOD BY AUTOMATED COUNT: 12.4 % (ref 12.3–15.4)
ERYTHROCYTE [DISTWIDTH] IN BLOOD BY AUTOMATED COUNT: 12.5 % (ref 12.3–15.4)
GFR SERPL CREATININE-BSD FRML MDRD: 83 ML/MIN/1.73
GFR SERPL CREATININE-BSD FRML MDRD: 84 ML/MIN/1.73
GLOBULIN UR ELPH-MCNC: 2.3 GM/DL
GLOBULIN UR ELPH-MCNC: 2.6 GM/DL
GLUCOSE SERPL-MCNC: 100 MG/DL (ref 65–99)
GLUCOSE SERPL-MCNC: 103 MG/DL (ref 65–99)
HCT VFR BLD AUTO: 39.7 % (ref 34–46.6)
HCT VFR BLD AUTO: 42.6 % (ref 34–46.6)
HGB BLD-MCNC: 12.7 G/DL (ref 12–15.9)
HGB BLD-MCNC: 13.3 G/DL (ref 12–15.9)
IMM GRANULOCYTES # BLD AUTO: 0.02 10*3/MM3 (ref 0–0.05)
IMM GRANULOCYTES # BLD AUTO: 0.03 10*3/MM3 (ref 0–0.05)
IMM GRANULOCYTES NFR BLD AUTO: 0.4 % (ref 0–0.5)
IMM GRANULOCYTES NFR BLD AUTO: 0.4 % (ref 0–0.5)
LIPASE SERPL-CCNC: 20 U/L (ref 13–60)
LYMPHOCYTES # BLD AUTO: 0.9 10*3/MM3 (ref 0.7–3.1)
LYMPHOCYTES # BLD AUTO: 1.07 10*3/MM3 (ref 0.7–3.1)
LYMPHOCYTES NFR BLD AUTO: 13.1 % (ref 19.6–45.3)
LYMPHOCYTES NFR BLD AUTO: 20.7 % (ref 19.6–45.3)
MAGNESIUM SERPL-MCNC: 1.9 MG/DL (ref 1.6–2.4)
MCH RBC QN AUTO: 30.5 PG (ref 26.6–33)
MCH RBC QN AUTO: 31.7 PG (ref 26.6–33)
MCHC RBC AUTO-ENTMCNC: 31.2 G/DL (ref 31.5–35.7)
MCHC RBC AUTO-ENTMCNC: 32 G/DL (ref 31.5–35.7)
MCV RBC AUTO: 97.7 FL (ref 79–97)
MCV RBC AUTO: 99 FL (ref 79–97)
MONOCYTES # BLD AUTO: 0.55 10*3/MM3 (ref 0.1–0.9)
MONOCYTES # BLD AUTO: 0.6 10*3/MM3 (ref 0.1–0.9)
MONOCYTES NFR BLD AUTO: 10.7 % (ref 5–12)
MONOCYTES NFR BLD AUTO: 8.8 % (ref 5–12)
NEUTROPHILS NFR BLD AUTO: 3.29 10*3/MM3 (ref 1.7–7)
NEUTROPHILS NFR BLD AUTO: 5.2 10*3/MM3 (ref 1.7–7)
NEUTROPHILS NFR BLD AUTO: 63.7 % (ref 42.7–76)
NEUTROPHILS NFR BLD AUTO: 75.9 % (ref 42.7–76)
NRBC BLD AUTO-RTO: 0 /100 WBC (ref 0–0.2)
NRBC BLD AUTO-RTO: 0 /100 WBC (ref 0–0.2)
NT-PROBNP SERPL-MCNC: 149.6 PG/ML (ref 0–1800)
PLATELET # BLD AUTO: 235 10*3/MM3 (ref 140–450)
PLATELET # BLD AUTO: 240 10*3/MM3 (ref 140–450)
PMV BLD AUTO: 10.2 FL (ref 6–12)
PMV BLD AUTO: 10.5 FL (ref 6–12)
POTASSIUM SERPL-SCNC: 3.8 MMOL/L (ref 3.5–5.2)
POTASSIUM SERPL-SCNC: 4.3 MMOL/L (ref 3.5–5.2)
PROT SERPL-MCNC: 6 G/DL (ref 6–8.5)
PROT SERPL-MCNC: 6.8 G/DL (ref 6–8.5)
RBC # BLD AUTO: 4.01 10*6/MM3 (ref 3.77–5.28)
RBC # BLD AUTO: 4.36 10*6/MM3 (ref 3.77–5.28)
SODIUM SERPL-SCNC: 139 MMOL/L (ref 136–145)
SODIUM SERPL-SCNC: 143 MMOL/L (ref 136–145)
TROPONIN T SERPL-MCNC: <0.01 NG/ML (ref 0–0.03)
WBC # BLD AUTO: 5.16 10*3/MM3 (ref 3.4–10.8)
WBC # BLD AUTO: 6.85 10*3/MM3 (ref 3.4–10.8)

## 2020-12-23 PROCEDURE — 82550 ASSAY OF CK (CPK): CPT | Performed by: NURSE PRACTITIONER

## 2020-12-23 PROCEDURE — 93005 ELECTROCARDIOGRAM TRACING: CPT | Performed by: NURSE PRACTITIONER

## 2020-12-23 PROCEDURE — 85025 COMPLETE CBC W/AUTO DIFF WBC: CPT | Performed by: NURSE PRACTITIONER

## 2020-12-23 PROCEDURE — 70450 CT HEAD/BRAIN W/O DYE: CPT

## 2020-12-23 PROCEDURE — 99284 EMERGENCY DEPT VISIT MOD MDM: CPT

## 2020-12-23 PROCEDURE — 73030 X-RAY EXAM OF SHOULDER: CPT

## 2020-12-23 PROCEDURE — 83735 ASSAY OF MAGNESIUM: CPT | Performed by: NURSE PRACTITIONER

## 2020-12-23 PROCEDURE — 80053 COMPREHEN METABOLIC PANEL: CPT | Performed by: EMERGENCY MEDICINE

## 2020-12-23 PROCEDURE — 83605 ASSAY OF LACTIC ACID: CPT | Performed by: NURSE PRACTITIONER

## 2020-12-23 PROCEDURE — 72125 CT NECK SPINE W/O DYE: CPT

## 2020-12-23 PROCEDURE — 96372 THER/PROPH/DIAG INJ SC/IM: CPT

## 2020-12-23 PROCEDURE — 71045 X-RAY EXAM CHEST 1 VIEW: CPT

## 2020-12-23 PROCEDURE — 99283 EMERGENCY DEPT VISIT LOW MDM: CPT

## 2020-12-23 PROCEDURE — 25010000002 LORAZEPAM PER 2 MG: Performed by: EMERGENCY MEDICINE

## 2020-12-23 PROCEDURE — 83690 ASSAY OF LIPASE: CPT | Performed by: NURSE PRACTITIONER

## 2020-12-23 PROCEDURE — 80053 COMPREHEN METABOLIC PANEL: CPT | Performed by: NURSE PRACTITIONER

## 2020-12-23 PROCEDURE — 84484 ASSAY OF TROPONIN QUANT: CPT | Performed by: NURSE PRACTITIONER

## 2020-12-23 PROCEDURE — 83880 ASSAY OF NATRIURETIC PEPTIDE: CPT | Performed by: NURSE PRACTITIONER

## 2020-12-23 PROCEDURE — 85025 COMPLETE CBC W/AUTO DIFF WBC: CPT | Performed by: EMERGENCY MEDICINE

## 2020-12-23 RX ORDER — LORAZEPAM 2 MG/ML
1 INJECTION INTRAMUSCULAR ONCE
Status: COMPLETED | OUTPATIENT
Start: 2020-12-23 | End: 2020-12-23

## 2020-12-23 RX ORDER — LORAZEPAM 2 MG/ML
0.5 INJECTION INTRAMUSCULAR ONCE
Status: COMPLETED | OUTPATIENT
Start: 2020-12-23 | End: 2020-12-23

## 2020-12-23 RX ORDER — SODIUM CHLORIDE 0.9 % (FLUSH) 0.9 %
10 SYRINGE (ML) INJECTION AS NEEDED
Status: DISCONTINUED | OUTPATIENT
Start: 2020-12-23 | End: 2020-12-23 | Stop reason: HOSPADM

## 2020-12-23 RX ORDER — SODIUM CHLORIDE 0.9 % (FLUSH) 0.9 %
10 SYRINGE (ML) INJECTION AS NEEDED
Status: DISCONTINUED | OUTPATIENT
Start: 2020-12-23 | End: 2020-12-24 | Stop reason: HOSPADM

## 2020-12-23 RX ORDER — ZIPRASIDONE MESYLATE 20 MG/ML
10 INJECTION, POWDER, LYOPHILIZED, FOR SOLUTION INTRAMUSCULAR ONCE
Status: DISCONTINUED | OUTPATIENT
Start: 2020-12-23 | End: 2020-12-23

## 2020-12-23 RX ADMIN — LORAZEPAM 0.5 MG: 2 INJECTION INTRAMUSCULAR; INTRAVENOUS at 12:59

## 2020-12-23 RX ADMIN — LORAZEPAM 1 MG: 2 INJECTION INTRAMUSCULAR; INTRAVENOUS at 14:28

## 2020-12-23 RX ADMIN — SODIUM CHLORIDE 500 ML: 9 INJECTION, SOLUTION INTRAVENOUS at 12:01

## 2020-12-23 RX ADMIN — LORAZEPAM 1 MG: 2 INJECTION INTRAMUSCULAR; INTRAVENOUS at 15:49

## 2020-12-23 RX ADMIN — SODIUM CHLORIDE 500 ML: 9 INJECTION, SOLUTION INTRAVENOUS at 23:14

## 2020-12-24 VITALS
SYSTOLIC BLOOD PRESSURE: 117 MMHG | HEART RATE: 78 BPM | DIASTOLIC BLOOD PRESSURE: 74 MMHG | TEMPERATURE: 97.3 F | OXYGEN SATURATION: 95 % | HEIGHT: 62 IN | WEIGHT: 150 LBS | BODY MASS INDEX: 27.6 KG/M2 | RESPIRATION RATE: 16 BRPM

## 2020-12-24 LAB
BACTERIA UR QL AUTO: ABNORMAL /HPF
BILIRUB UR QL STRIP: NEGATIVE
CLARITY UR: ABNORMAL
COLOR UR: YELLOW
GLUCOSE UR STRIP-MCNC: NEGATIVE MG/DL
HGB UR QL STRIP.AUTO: NEGATIVE
HYALINE CASTS UR QL AUTO: ABNORMAL /LPF
KETONES UR QL STRIP: ABNORMAL
LEUKOCYTE ESTERASE UR QL STRIP.AUTO: ABNORMAL
NITRITE UR QL STRIP: NEGATIVE
PH UR STRIP.AUTO: 6 [PH] (ref 5–8)
PROT UR QL STRIP: NEGATIVE
RBC # UR: ABNORMAL /HPF
REF LAB TEST METHOD: ABNORMAL
SP GR UR STRIP: 1.02 (ref 1–1.03)
SQUAMOUS #/AREA URNS HPF: ABNORMAL /HPF
UROBILINOGEN UR QL STRIP: ABNORMAL
WBC UR QL AUTO: ABNORMAL /HPF

## 2020-12-24 PROCEDURE — 81001 URINALYSIS AUTO W/SCOPE: CPT | Performed by: EMERGENCY MEDICINE

## 2020-12-26 LAB
QT INTERVAL: 458 MS
QTC INTERVAL: 425 MS

## 2021-01-04 ENCOUNTER — TELEPHONE (OUTPATIENT)
Dept: CARDIOLOGY | Facility: HOSPITAL | Age: 81
End: 2021-01-04

## 2021-01-04 NOTE — TELEPHONE ENCOUNTER
Patient was referred to the [facility] by the [facility], [place], operated by [facility]. Several attempts have been made to contact the office to schedule. Letter was mailed to pt to contact the office to schedule.

## 2021-01-20 ENCOUNTER — HOSPITAL ENCOUNTER (EMERGENCY)
Facility: HOSPITAL | Age: 81
Discharge: NURSING, REHAB OR LONG-TERM CARE | End: 2021-01-21
Attending: EMERGENCY MEDICINE | Admitting: EMERGENCY MEDICINE

## 2021-01-20 ENCOUNTER — APPOINTMENT (OUTPATIENT)
Dept: GENERAL RADIOLOGY | Facility: HOSPITAL | Age: 81
End: 2021-01-20

## 2021-01-20 DIAGNOSIS — F03.918 CHRONIC DEMENTIA WITH BEHAVIORAL DISTURBANCE (HCC): Primary | ICD-10-CM

## 2021-01-20 DIAGNOSIS — N39.0 URINARY TRACT INFECTION WITHOUT HEMATURIA, SITE UNSPECIFIED: ICD-10-CM

## 2021-01-20 LAB
ALBUMIN SERPL-MCNC: 3.9 G/DL (ref 3.5–5.2)
ALBUMIN/GLOB SERPL: 1.8 G/DL
ALP SERPL-CCNC: 89 U/L (ref 39–117)
ALT SERPL W P-5'-P-CCNC: 7 U/L (ref 1–33)
ANION GAP SERPL CALCULATED.3IONS-SCNC: 12 MMOL/L (ref 5–15)
AST SERPL-CCNC: 14 U/L (ref 1–32)
BACTERIA UR QL AUTO: ABNORMAL /HPF
BASOPHILS # BLD AUTO: 0.06 10*3/MM3 (ref 0–0.2)
BASOPHILS NFR BLD AUTO: 0.8 % (ref 0–1.5)
BILIRUB SERPL-MCNC: 0.2 MG/DL (ref 0–1.2)
BILIRUB UR QL STRIP: NEGATIVE
BUN SERPL-MCNC: 18 MG/DL (ref 8–23)
BUN/CREAT SERPL: 17.8 (ref 7–25)
CALCIUM SPEC-SCNC: 10.4 MG/DL (ref 8.6–10.5)
CHLORIDE SERPL-SCNC: 106 MMOL/L (ref 98–107)
CLARITY UR: ABNORMAL
CO2 SERPL-SCNC: 23 MMOL/L (ref 22–29)
COD CRY URNS QL: ABNORMAL /HPF
COLOR UR: ABNORMAL
CREAT SERPL-MCNC: 1.01 MG/DL (ref 0.57–1)
DEPRECATED RDW RBC AUTO: 46.3 FL (ref 37–54)
EOSINOPHIL # BLD AUTO: 0.17 10*3/MM3 (ref 0–0.4)
EOSINOPHIL NFR BLD AUTO: 2.4 % (ref 0.3–6.2)
ERYTHROCYTE [DISTWIDTH] IN BLOOD BY AUTOMATED COUNT: 13 % (ref 12.3–15.4)
GFR SERPL CREATININE-BSD FRML MDRD: 53 ML/MIN/1.73
GLOBULIN UR ELPH-MCNC: 2.2 GM/DL
GLUCOSE SERPL-MCNC: 93 MG/DL (ref 65–99)
GLUCOSE UR STRIP-MCNC: NEGATIVE MG/DL
HCT VFR BLD AUTO: 38.3 % (ref 34–46.6)
HGB BLD-MCNC: 11.9 G/DL (ref 12–15.9)
HGB UR QL STRIP.AUTO: NEGATIVE
HOLD SPECIMEN: NORMAL
HOLD SPECIMEN: NORMAL
HYALINE CASTS UR QL AUTO: ABNORMAL /LPF
IMM GRANULOCYTES # BLD AUTO: 0.02 10*3/MM3 (ref 0–0.05)
IMM GRANULOCYTES NFR BLD AUTO: 0.3 % (ref 0–0.5)
KETONES UR QL STRIP: ABNORMAL
LEUKOCYTE ESTERASE UR QL STRIP.AUTO: ABNORMAL
LYMPHOCYTES # BLD AUTO: 1.81 10*3/MM3 (ref 0.7–3.1)
LYMPHOCYTES NFR BLD AUTO: 25.6 % (ref 19.6–45.3)
MAGNESIUM SERPL-MCNC: 2.1 MG/DL (ref 1.6–2.4)
MCH RBC QN AUTO: 30.1 PG (ref 26.6–33)
MCHC RBC AUTO-ENTMCNC: 31.1 G/DL (ref 31.5–35.7)
MCV RBC AUTO: 97 FL (ref 79–97)
MONOCYTES # BLD AUTO: 0.74 10*3/MM3 (ref 0.1–0.9)
MONOCYTES NFR BLD AUTO: 10.5 % (ref 5–12)
NEUTROPHILS NFR BLD AUTO: 4.26 10*3/MM3 (ref 1.7–7)
NEUTROPHILS NFR BLD AUTO: 60.4 % (ref 42.7–76)
NITRITE UR QL STRIP: NEGATIVE
NRBC BLD AUTO-RTO: 0 /100 WBC (ref 0–0.2)
PH UR STRIP.AUTO: <=5 [PH] (ref 5–8)
PLATELET # BLD AUTO: 252 10*3/MM3 (ref 140–450)
PMV BLD AUTO: 10.1 FL (ref 6–12)
POTASSIUM SERPL-SCNC: 4.1 MMOL/L (ref 3.5–5.2)
PROT SERPL-MCNC: 6.1 G/DL (ref 6–8.5)
PROT UR QL STRIP: ABNORMAL
QT INTERVAL: 418 MS
QTC INTERVAL: 434 MS
RBC # BLD AUTO: 3.95 10*6/MM3 (ref 3.77–5.28)
RBC # UR: ABNORMAL /HPF
REF LAB TEST METHOD: ABNORMAL
SODIUM SERPL-SCNC: 141 MMOL/L (ref 136–145)
SP GR UR STRIP: 1.03 (ref 1–1.03)
SQUAMOUS #/AREA URNS HPF: ABNORMAL /HPF
TROPONIN T SERPL-MCNC: <0.01 NG/ML (ref 0–0.03)
TSH SERPL DL<=0.05 MIU/L-ACNC: 1.97 UIU/ML (ref 0.27–4.2)
UROBILINOGEN UR QL STRIP: ABNORMAL
WBC # BLD AUTO: 7.06 10*3/MM3 (ref 3.4–10.8)
WBC UR QL AUTO: ABNORMAL /HPF
WHOLE BLOOD HOLD SPECIMEN: NORMAL
WHOLE BLOOD HOLD SPECIMEN: NORMAL

## 2021-01-20 PROCEDURE — 84484 ASSAY OF TROPONIN QUANT: CPT | Performed by: EMERGENCY MEDICINE

## 2021-01-20 PROCEDURE — 80053 COMPREHEN METABOLIC PANEL: CPT | Performed by: EMERGENCY MEDICINE

## 2021-01-20 PROCEDURE — 71045 X-RAY EXAM CHEST 1 VIEW: CPT

## 2021-01-20 PROCEDURE — 83735 ASSAY OF MAGNESIUM: CPT | Performed by: EMERGENCY MEDICINE

## 2021-01-20 PROCEDURE — 81001 URINALYSIS AUTO W/SCOPE: CPT | Performed by: EMERGENCY MEDICINE

## 2021-01-20 PROCEDURE — 84443 ASSAY THYROID STIM HORMONE: CPT | Performed by: EMERGENCY MEDICINE

## 2021-01-20 PROCEDURE — 25010000002 CEFTRIAXONE PER 250 MG: Performed by: EMERGENCY MEDICINE

## 2021-01-20 PROCEDURE — 99285 EMERGENCY DEPT VISIT HI MDM: CPT

## 2021-01-20 PROCEDURE — 85025 COMPLETE CBC W/AUTO DIFF WBC: CPT | Performed by: EMERGENCY MEDICINE

## 2021-01-20 PROCEDURE — 96365 THER/PROPH/DIAG IV INF INIT: CPT

## 2021-01-20 PROCEDURE — 93005 ELECTROCARDIOGRAM TRACING: CPT | Performed by: EMERGENCY MEDICINE

## 2021-01-20 RX ORDER — SODIUM CHLORIDE 0.9 % (FLUSH) 0.9 %
10 SYRINGE (ML) INJECTION AS NEEDED
Status: DISCONTINUED | OUTPATIENT
Start: 2021-01-20 | End: 2021-01-21 | Stop reason: HOSPADM

## 2021-01-20 RX ORDER — PHENOL 1.4 %
600 AEROSOL, SPRAY (ML) MUCOUS MEMBRANE DAILY
COMMUNITY
End: 2021-01-28 | Stop reason: SDUPTHER

## 2021-01-20 RX ORDER — CEPHALEXIN 500 MG/1
500 CAPSULE ORAL 4 TIMES DAILY
Qty: 40 CAPSULE | Refills: 0 | Status: SHIPPED | OUTPATIENT
Start: 2021-01-20 | End: 2021-01-28

## 2021-01-20 RX ORDER — CHOLECALCIFEROL (VITAMIN D3) 125 MCG
500 CAPSULE ORAL DAILY
COMMUNITY
End: 2021-01-28 | Stop reason: SDUPTHER

## 2021-01-20 RX ADMIN — CEFTRIAXONE SODIUM 1 G: 1 INJECTION, POWDER, FOR SOLUTION INTRAMUSCULAR; INTRAVENOUS at 23:28

## 2021-01-21 VITALS
SYSTOLIC BLOOD PRESSURE: 156 MMHG | RESPIRATION RATE: 16 BRPM | HEART RATE: 65 BPM | HEIGHT: 63 IN | BODY MASS INDEX: 26.58 KG/M2 | TEMPERATURE: 97.9 F | DIASTOLIC BLOOD PRESSURE: 71 MMHG | OXYGEN SATURATION: 98 % | WEIGHT: 150 LBS

## 2021-01-21 RX ORDER — QUETIAPINE FUMARATE 25 MG/1
50 TABLET, FILM COATED ORAL ONCE
Status: DISCONTINUED | OUTPATIENT
Start: 2021-01-21 | End: 2021-01-21 | Stop reason: HOSPADM

## 2021-01-21 NOTE — ED PROVIDER NOTES
"    EMERGENCY DEPARTMENT ENCOUNTER      Pt Name: Julieta King  MRN: [id number]  YOB: 1940  Date of evaluation: 1/20/2021  Provider: Nicola Brooks DO    CHIEF COMPLAINT       Chief Complaint   Patient presents with   • Altered Mental Status   • Weakness - Generalized         HISTORY OF PRESENT ILLNESS  (Location/Symptom, Timing/Onset, Context/Setting, Quality, Duration, Modifying Factors, Severity.)   Julieta King is a 80 y.o. female who presents to the emergency department from a nursing facility for evaluation as the patient was found wandering down another graham at her facility, the staff noted to EMS that the patient stated she felt \"faint\" and this prompted them to seek evaluation at the hospital.  On arrival the patient is awake, alert but does have obvious underlying dementia which limits my HPI.  The patient states she feels perfectly well, is unsure why she is here, states she is \"perturbed\".  She denies any chest pain, difficulty breathing, Preston pain, denies unilateral weakness, numbness or tingling.  She states she has things to do this evening, states she is ready to leave does not know why she is still here.  She does not have any abdominal pain, no dysuria.  She states she is frustrated secondary to my questioning she states she has no acute complaints at this time.  No pain.      Nursing notes were reviewed.    REVIEW OF SYSTEMS    (2-9 systems for level 4, 10 or more for level 5)   ROS:  General:  No fevers, no chills, no weakness  Cardiovascular:  No chest pain  Respiratory:  No shortness of breath, no cough  Gastrointestinal:  No pain, no nausea, no vomiting, no diarrhea  Skin:  No rash  Neurologic:  No headache  Psychiatric:  No anxiety  Genitourinary:  No dysuria, no hematuria    Except as noted above the remainder of the review of systems was reviewed and negative.       PAST MEDICAL HISTORY     Past Medical History:   Diagnosis Date   • Benign essential " hypertension    • Bilateral hip joint arthritis    • Cataract    • Dementia (CMS/HCC)    • GERD without esophagitis    • Hyperlipidemia    • Hypertension    • Hyponatremia     metabolic encephalopathy/viral bronchitis/dehydration/abnormal liver enzymes   • Hypothyroidism (acquired)    • Memory deficit    • Mixed hyperlipidemia    • Osteoarthritis    • Osteopenia of multiple sites    • Ovarian cyst      and uterine fibroids   • Seborrheic keratoses    • Vitamin D deficiency          SURGICAL HISTORY       Past Surgical History:   Procedure Laterality Date   • BLEPHAROPLASTY Bilateral 2007   • TOTAL HIP ARTHROPLASTY Bilateral 1992   • TOTAL HIP ARTHROPLASTY REVISION Right 01/28/2013   • TOTAL HIP ARTHROPLASTY REVISION Left 2012         CURRENT MEDICATIONS       Current Facility-Administered Medications:   •  cefTRIAXone (ROCEPHIN) 1 g/100 mL 0.9% NS (MBP), 1 g, Intravenous, Once, Nicola Brooks, DO  •  sodium chloride 0.9 % flush 10 mL, 10 mL, Intravenous, PRN, Nicola Brooks, DO    Current Outpatient Medications:   •  acetaminophen (TYLENOL) 650 MG 8 hr tablet, Take 650 mg by mouth Every 8 (Eight) Hours As Needed for Mild Pain ., Disp: , Rfl:   •  ALBUTEROL IN, Inhale 2 puffs Every 4 (Four) Hours As Needed., Disp: , Rfl:   •  amLODIPine (NORVASC) 2.5 MG tablet, Take 1 tablet by mouth Daily., Disp: 30 tablet, Rfl: 11  •  calcium carbonate (OS-SONIDO) 600 MG tablet, Take 600 mg by mouth Daily., Disp: , Rfl:   •  colestipol (COLESTID) 1 g tablet, Take 1 tablet by mouth Daily., Disp: 30 tablet, Rfl: 0  •  donepezil (ARICEPT) 10 MG tablet, TAKE 1 TABLET BY MOUTH DAILY *NEEDS FOLLOW-UP FOR FURTHER REFILLS*, Disp: 30 tablet, Rfl: 1  •  escitalopram (LEXAPRO) 20 MG tablet, Take 0.5 tablets by mouth Daily., Disp: 30 tablet, Rfl: 11  •  levothyroxine (SYNTHROID, LEVOTHROID) 50 MCG tablet, TAKE ONE TABLET BY MOUTH EVERY DAY, Disp: 90 tablet, Rfl: 0  •  losartan (COZAAR) 100 MG tablet, Take 1 tablet by mouth Daily.,  Disp: 90 tablet, Rfl: 3  •  memantine (NAMENDA) 10 MG tablet, Take 1 tablet by mouth 2 (Two) Times a Day., Disp: 60 tablet, Rfl: 11  •  Probiotic Product (PROBIOTIC DAILY PO), Take  by mouth., Disp: , Rfl:   •  vitamin B-12 (CYANOCOBALAMIN) 500 MCG tablet, Take 500 mcg by mouth Daily., Disp: , Rfl:   •  cephalexin (KEFLEX) 500 MG capsule, Take 1 capsule by mouth 4 (Four) Times a Day for 10 days., Disp: 40 capsule, Rfl: 0    ALLERGIES     Lisinopril    FAMILY HISTORY       Family History   Problem Relation Age of Onset   • Dementia Father    • Diabetes Father    • Coronary artery disease Father    • Hypertension Father    • Coronary artery disease Mother    • Hypertension Mother    • Stroke Mother         hemorrhagic after TPA   • Coronary artery disease Paternal Uncle    • Ovarian cancer Maternal Grandmother    • Uterine cancer Maternal Grandmother           SOCIAL HISTORY       Social History     Socioeconomic History   • Marital status:      Spouse name: Not on file   • Number of children: Not on file   • Years of education: Not on file   • Highest education level: Not on file   Tobacco Use   • Smoking status: Never Smoker   • Smokeless tobacco: Never Used   Substance and Sexual Activity   • Alcohol use: Yes     Comment: rarely   • Drug use: No   • Sexual activity: Defer         PHYSICAL EXAM    (up to 7 for level 4, 8 or more for level 5)     Vitals:    01/20/21 2200 01/20/21 2240 01/20/21 2245 01/20/21 2300   BP: 134/80   (!) 152/110   Pulse: 67 66 87 66   Resp:       Temp:       TempSrc:       SpO2: 93% 96% 99% 100%   Weight:       Height:           Physical Exam  General : Patient is awake, alert, in no acute distress, obvious dementia as noted above patient is a pleasant, answering questions although is confused on why she is here.  HEENT: Pupils are equally round and reactive to light, EOMI, conjunctivae clear  Neck: Neck is supple, full range of motion, trachea midline  Cardiac: Heart regular rate,  rhythm  Lungs: Lungs are clear to auscultation, no respiratory distress  Chest wall: There is no tenderness to palpation over the chest wall or over ribs  Abdomen: Abdomen is soft, nontender, nondistended. There are no firm or pulsatile masses, no rebound rigidity or guarding.   Musculoskeletal: 5 out of 5 strength in all 4 extremities.  No focal muscle deficits are appreciated  Neuro: Motor intact, sensory intact, level of consciousness is normal, underlying dementia noted, patient follows commands without issue.  Dermatology: Skin is warm and dry  Psych: Mentation is grossly normal, cognition is consistent with underlying dementia      DIAGNOSTIC RESULTS     EKG: All EKG's are interpreted by the Emergency Department Physician who either signs or Co-signs this chart in the absence of a cardiologist.    ECG 12 Lead   Final Result   Test Reason : weakness   Blood Pressure : **/** mmHG   Vent. Rate : 065 BPM     Atrial Rate : 065 BPM      P-R Int : 146 ms          QRS Dur : 090 ms       QT Int : 418 ms       P-R-T Axes : 020 009 014 degrees      QTc Int : 434 ms      Normal sinus rhythm   Low voltage QRS   Borderline ECG   When compared with ECG of 23-DEC-2020 12:02,   No significant change was found   Confirmed by IMAN CHRISTIANSON MD ([id number]) on 1/20/2021 9:42:24 PM      Referred By:  triage           Confirmed By:IMAN CHRISTIANSON MD          RADIOLOGY:   Non-plain film images such as CT, Ultrasound and MRI are read by the radiologist. Plain radiographic images are visualized and preliminarily interpreted by the emergency physician with the below findings:      [] Radiologist's Report Reviewed:  XR Chest 1 View   Final Result      1. No significant interval change.      Signer Name: Britton Olivares MD    Signed: 1/20/2021 10:15 PM    Workstation Name: [id number]     [facility]            ED BEDSIDE ULTRASOUND:   Performed by ED Physician - none    LABS:    I have reviewed and interpreted all of  the currently available lab results from this visit (if applicable):  Results for orders placed or performed during the hospital encounter of 01/20/21   Comprehensive Metabolic Panel    Specimen: Blood   Result Value Ref Range    Glucose 93 65 - 99 mg/dL    BUN 18 8 - 23 mg/dL    Creatinine 1.01 (H) 0.57 - 1.00 mg/dL    Sodium 141 136 - 145 mmol/L    Potassium 4.1 3.5 - 5.2 mmol/L    Chloride 106 98 - 107 mmol/L    CO2 23.0 22.0 - 29.0 mmol/L    Calcium 10.4 8.6 - 10.5 mg/dL    Total Protein 6.1 6.0 - 8.5 g/dL    Albumin 3.90 3.50 - 5.20 g/dL    ALT (SGPT) 7 1 - 33 U/L    AST (SGOT) 14 1 - 32 U/L    Alkaline Phosphatase 89 39 - 117 U/L    Total Bilirubin 0.2 0.0 - 1.2 mg/dL    eGFR Non African Amer 53 (L) >60 mL/min/1.73    Globulin 2.2 gm/dL    A/G Ratio 1.8 g/dL    BUN/Creatinine Ratio 17.8 7.0 - 25.0    Anion Gap 12.0 5.0 - 15.0 mmol/L   Troponin    Specimen: Blood   Result Value Ref Range    Troponin T <0.010 0.000 - 0.030 ng/mL   Magnesium    Specimen: Blood   Result Value Ref Range    Magnesium 2.1 1.6 - 2.4 mg/dL   Urinalysis With Microscopic If Indicated (No Culture) - Urine, Clean Catch    Specimen: Urine, Clean Catch   Result Value Ref Range    Color, UA Dark Yellow (A) Yellow, Straw    Appearance, UA Turbid (A) Clear    pH, UA <=5.0 5.0 - 8.0    Specific Gravity, UA 1.032 (H) 1.001 - 1.030    Glucose, UA Negative Negative    Ketones, UA 15 mg/dL (1+) (A) Negative    Bilirubin, UA Negative Negative    Blood, UA Negative Negative    Protein, UA Trace (A) Negative    Leuk Esterase, UA Trace (A) Negative    Nitrite, UA Negative Negative    Urobilinogen, UA 1.0 E.U./dL 0.2 - 1.0 E.U./dL   CBC Auto Differential    Specimen: Blood   Result Value Ref Range    WBC 7.06 3.40 - 10.80 10*3/mm3    RBC 3.95 3.77 - 5.28 10*6/mm3    Hemoglobin 11.9 (L) 12.0 - 15.9 g/dL    Hematocrit 38.3 34.0 - 46.6 %    MCV 97.0 79.0 - 97.0 fL    MCH 30.1 26.6 - 33.0 pg    MCHC 31.1 (L) 31.5 - 35.7 g/dL    RDW 13.0 12.3 - 15.4 %     RDW-SD 46.3 37.0 - 54.0 fl    MPV 10.1 6.0 - 12.0 fL    Platelets 252 140 - 450 10*3/mm3    Neutrophil % 60.4 42.7 - 76.0 %    Lymphocyte % 25.6 19.6 - 45.3 %    Monocyte % 10.5 5.0 - 12.0 %    Eosinophil % 2.4 0.3 - 6.2 %    Basophil % 0.8 0.0 - 1.5 %    Immature Grans % 0.3 0.0 - 0.5 %    Neutrophils, Absolute 4.26 1.70 - 7.00 10*3/mm3    Lymphocytes, Absolute 1.81 0.70 - 3.10 10*3/mm3    Monocytes, Absolute 0.74 0.10 - 0.90 10*3/mm3    Eosinophils, Absolute 0.17 0.00 - 0.40 10*3/mm3    Basophils, Absolute 0.06 0.00 - 0.20 10*3/mm3    Immature Grans, Absolute 0.02 0.00 - 0.05 10*3/mm3    nRBC 0.0 0.0 - 0.2 /100 WBC   TSH    Specimen: Blood   Result Value Ref Range    TSH 1.970 0.270 - 4.200 uIU/mL   Urinalysis, Microscopic Only - Urine, Clean Catch    Specimen: Urine, Clean Catch   Result Value Ref Range    RBC, UA 0-2 None Seen, 0-2 /HPF    WBC, UA 6-12 (A) None Seen, 0-2 /HPF    Bacteria, UA 1+ (A) None Seen, Trace /HPF    Squamous Epithelial Cells, UA 7-12 (A) None Seen, 0-2 /HPF    Hyaline Casts, UA 7-12 0 - 6 /LPF    Calcium Oxalate Crystals, UA Large/3+ None Seen /HPF    Methodology Manual Light Microscopy    ECG 12 Lead   Result Value Ref Range    QT Interval 418 ms    QTC Interval 434 ms   Light Blue Top   Result Value Ref Range    Extra Tube hold for add-on    Green Top (Gel)   Result Value Ref Range    Extra Tube Hold for add-ons.    Lavender Top   Result Value Ref Range    Extra Tube hold for add-on    Gold Top - SST   Result Value Ref Range    Extra Tube Hold for add-ons.         All other labs were within normal range or not returned as of this dictation.      EMERGENCY DEPARTMENT COURSE and DIFFERENTIAL DIAGNOSIS/MDM:   Vitals:    Vitals:    01/20/21 2200 01/20/21 2240 01/20/21 2245 01/20/21 2300   BP: 134/80   (!) 152/110   Pulse: 67 66 87 66   Resp:       Temp:       TempSrc:       SpO2: 93% 96% 99% 100%   Weight:       Height:                Patient with significant history of dementia, multiple  chronic comorbidities who was found wandering in random graham at her nursing facility, described herself as faint per the nursing staff to EMS.  Patient adamantly denies this and states that she feels well, does not know why she is here.  My HPI is with no significant abnormalities, no acute complaints per the patient.  Her vital signs are stable on arrival, she is afebrile.  I discussed the patient if we could check basic labs, urinalysis to make sure nothing is abnormal, she is agreeable to this but is somewhat frustrated.  Blood work is unremarkable, urinalysis with underlying urine tract infection which we will treat, first dose of antibiotic given in the ED.  Will write a prescription for the same Keflex to the pharmacy.  Patient will be discharged back to her facility which I feel is appropriate as she is underlying dementia and familiar setting is plan more appropriate for the patient.  She is nontoxic-appearing, the plan on discharge back to her facility for continued treatments, good fluid hydration, monitoring with their specialist/PCP. I had a discussion with the patient/family regarding diagnosis, diagnostic results, treatment plan, and medications.  The patient/family indicated understanding of these instructions.  I spent adequate time at the bedside preceding discharge necessary to personally discuss the aftercare instructions, giving patient education, providing explanations of the results of our evaluations/findings, and my decision making to assure that the patient/family understand the plan of care.  Time was allotted to answer questions at that time and throughout the ED course.  Emphasis was placed on timely follow-up after discharge.  I also discussed the potential for the development of an acute emergent condition requiring further evaluation, admission, or even surgical intervention. I discussed that we found nothing during the visit today indicating the need for further workup, admission, or  the presence of an unstable medical condition.  I encouraged the patient to return to the emergency department immediately for ANY concerns, worsening, new complaints, or if symptoms persist and unable to seek follow-up in a timely fashion.  The patient/family expressed understanding and agreement with this plan.  The patient will follow-up with their PCP in 1-2 days for reevaluation.       MEDICATIONS ADMINISTERED IN ED:  Medications   sodium chloride 0.9 % flush 10 mL (has no administration in time range)   cefTRIAXone (ROCEPHIN) 1 g/100 mL 0.9% NS (MBP) (has no administration in time range)       PROCEDURES:  Procedures    CRITICAL CARE TIME    Total Critical Care time was 0 minutes, excluding separately reportable procedures.   There was a high probability of clinically significant/life threatening deterioration in the patient's condition which required my urgent intervention.      FINAL IMPRESSION      1. Chronic dementia with behavioral disturbance (CMS/HCC)    2. Urinary tract infection without hematuria, site unspecified          DISPOSITION/PLAN     ED Disposition     ED Disposition Condition Comment    Discharge Stable           PATIENT REFERRED TO:  Mary Jane Goss MD  [address] [place] [address]  [phone]    In 2 days      [facility] Emergency Department  [address] [id number] [phone]    If symptoms worsen      DISCHARGE MEDICATIONS:     Medication List      START taking these medications    cephalexin 500 MG capsule  Commonly known as: KEFLEX  Take 1 capsule by mouth 4 (Four) Times a Day for 10 days.        CONTINUE taking these medications    acetaminophen 650 MG 8 hr tablet  Commonly known as: TYLENOL     ALBUTEROL IN     amLODIPine 2.5 MG tablet  Commonly known as: NORVASC  Take 1 tablet by mouth Daily.     calcium carbonate 600 MG tablet  Commonly known as: OS-SONIDO     colestipol 1 g tablet  Commonly known as:  COLESTID  Take 1 tablet by mouth Daily.     donepezil 10 MG tablet  Commonly known as: ARICEPT  TAKE 1 TABLET BY MOUTH DAILY *NEEDS FOLLOW-UP FOR FURTHER REFILLS*     escitalopram 20 MG tablet  Commonly known as: LEXAPRO  Take 0.5 tablets by mouth Daily.     levothyroxine 50 MCG tablet  Commonly known as: SYNTHROID, LEVOTHROID  TAKE ONE TABLET BY MOUTH EVERY DAY     losartan 100 MG tablet  Commonly known as: COZAAR  Take 1 tablet by mouth Daily.     memantine 10 MG tablet  Commonly known as: NAMENDA  Take 1 tablet by mouth 2 (Two) Times a Day.     PROBIOTIC DAILY PO     vitamin B-12 500 MCG tablet  Commonly known as: CYANOCOBALAMIN        STOP taking these medications    QUEtiapine 25 MG tablet  Commonly known as: SEROquel           Where to Get Your Medications      These medications were sent to [facility] - [place], [place] - [facility] [address] - [phone]  - [phone] [facility] [address], [facility] [address]    Phone: [phone]   · cephalexin 500 MG capsule             Comment: Please note this report has been produced using speech recognition software.      Nicola Brooks DO  Attending Emergency Physician               Nicola Brooks DO  01/20/21 3731

## 2021-01-28 ENCOUNTER — HOSPITAL ENCOUNTER (OUTPATIENT)
Dept: GENERAL RADIOLOGY | Facility: HOSPITAL | Age: 81
Discharge: HOME | End: 2021-01-28
Admitting: INTERNAL MEDICINE

## 2021-01-28 ENCOUNTER — TELEPHONE (OUTPATIENT)
Dept: INTERNAL MEDICINE | Facility: CLINIC | Age: 81
End: 2021-01-28

## 2021-01-28 ENCOUNTER — OFFICE VISIT (OUTPATIENT)
Dept: INTERNAL MEDICINE | Facility: CLINIC | Age: 81
End: 2021-01-28

## 2021-01-28 VITALS
HEART RATE: 77 BPM | DIASTOLIC BLOOD PRESSURE: 72 MMHG | WEIGHT: 153 LBS | HEIGHT: 63 IN | OXYGEN SATURATION: 96 % | TEMPERATURE: 97.1 F | BODY MASS INDEX: 27.11 KG/M2 | SYSTOLIC BLOOD PRESSURE: 130 MMHG

## 2021-01-28 DIAGNOSIS — Z11.1 SCREENING-PULMONARY TB: ICD-10-CM

## 2021-01-28 DIAGNOSIS — M85.89 OSTEOPENIA OF MULTIPLE SITES: ICD-10-CM

## 2021-01-28 DIAGNOSIS — K59.1 FUNCTIONAL DIARRHEA: ICD-10-CM

## 2021-01-28 DIAGNOSIS — I10 BENIGN ESSENTIAL HYPERTENSION: ICD-10-CM

## 2021-01-28 DIAGNOSIS — F02.80 LATE ONSET ALZHEIMER'S DISEASE WITHOUT BEHAVIORAL DISTURBANCE (HCC): Primary | ICD-10-CM

## 2021-01-28 DIAGNOSIS — E78.2 MIXED HYPERLIPIDEMIA: ICD-10-CM

## 2021-01-28 DIAGNOSIS — H91.13 PRESBYCUSIS OF BOTH EARS: Chronic | ICD-10-CM

## 2021-01-28 DIAGNOSIS — E03.9 HYPOTHYROIDISM (ACQUIRED): ICD-10-CM

## 2021-01-28 DIAGNOSIS — G30.1 LATE ONSET ALZHEIMER'S DISEASE WITHOUT BEHAVIORAL DISTURBANCE (HCC): Primary | ICD-10-CM

## 2021-01-28 DIAGNOSIS — E66.3 OVERWEIGHT WITH BODY MASS INDEX (BMI) OF 27 TO 27.9 IN ADULT: Chronic | ICD-10-CM

## 2021-01-28 DIAGNOSIS — N30.00 ACUTE CYSTITIS WITHOUT HEMATURIA: ICD-10-CM

## 2021-01-28 PROBLEM — R82.90 ABNORMAL URINALYSIS: Status: RESOLVED | Noted: 2020-06-16 | Resolved: 2021-01-28

## 2021-01-28 PROCEDURE — 99215 OFFICE O/P EST HI 40 MIN: CPT | Performed by: INTERNAL MEDICINE

## 2021-01-28 PROCEDURE — 71046 X-RAY EXAM CHEST 2 VIEWS: CPT

## 2021-01-28 RX ORDER — MEMANTINE HYDROCHLORIDE 10 MG/1
10 TABLET ORAL 2 TIMES DAILY
Qty: 60 TABLET | Refills: 5 | Status: SHIPPED | OUTPATIENT
Start: 2021-01-28

## 2021-01-28 RX ORDER — QUETIAPINE FUMARATE 25 MG/1
25 TABLET, FILM COATED ORAL NIGHTLY
Qty: 30 TABLET | Refills: 5 | Status: SHIPPED | OUTPATIENT
Start: 2021-01-28

## 2021-01-28 RX ORDER — ZINC OXIDE 13 %
1 CREAM (GRAM) TOPICAL DAILY
Qty: 30 CAPSULE | Refills: 5 | Status: SHIPPED | OUTPATIENT
Start: 2021-01-28

## 2021-01-28 RX ORDER — AMLODIPINE BESYLATE 2.5 MG/1
2.5 TABLET ORAL DAILY
Qty: 30 TABLET | Refills: 5 | Status: SHIPPED | OUTPATIENT
Start: 2021-01-28

## 2021-01-28 RX ORDER — ESCITALOPRAM OXALATE 20 MG/1
10 TABLET ORAL DAILY
Qty: 30 TABLET | Refills: 5 | Status: SHIPPED | OUTPATIENT
Start: 2021-01-28 | End: 2022-01-28

## 2021-01-28 RX ORDER — QUETIAPINE FUMARATE 25 MG/1
25 TABLET, FILM COATED ORAL NIGHTLY
Qty: 30 TABLET | Refills: 5 | Status: SHIPPED | OUTPATIENT
Start: 2021-01-28 | End: 2021-01-28

## 2021-01-28 RX ORDER — QUETIAPINE FUMARATE 25 MG/1
25 TABLET, FILM COATED ORAL NIGHTLY
COMMUNITY
End: 2021-01-28 | Stop reason: SDUPTHER

## 2021-01-28 RX ORDER — ESCITALOPRAM OXALATE 20 MG/1
10 TABLET ORAL DAILY
Qty: 30 TABLET | Refills: 5 | Status: SHIPPED | OUTPATIENT
Start: 2021-01-28 | End: 2021-01-28

## 2021-01-28 RX ORDER — PHENOL 1.4 %
600 AEROSOL, SPRAY (ML) MUCOUS MEMBRANE DAILY
Qty: 30 TABLET | Refills: 5 | Status: SHIPPED | OUTPATIENT
Start: 2021-01-28 | End: 2021-01-28

## 2021-01-28 RX ORDER — MONTELUKAST SODIUM 4 MG/1
1 TABLET, CHEWABLE ORAL DAILY
Qty: 30 TABLET | Refills: 5 | Status: SHIPPED | OUTPATIENT
Start: 2021-01-28 | End: 2021-01-28

## 2021-01-28 RX ORDER — DONEPEZIL HYDROCHLORIDE 10 MG/1
10 TABLET, FILM COATED ORAL NIGHTLY
Qty: 30 TABLET | Refills: 5 | Status: SHIPPED | OUTPATIENT
Start: 2021-01-28

## 2021-01-28 RX ORDER — CHOLECALCIFEROL (VITAMIN D3) 125 MCG
500 CAPSULE ORAL DAILY
Qty: 30 TABLET | Refills: 5 | Status: SHIPPED | OUTPATIENT
Start: 2021-01-28 | End: 2021-01-28

## 2021-01-28 RX ORDER — LEVOTHYROXINE SODIUM 0.05 MG/1
50 TABLET ORAL DAILY
Qty: 30 TABLET | Refills: 5 | Status: SHIPPED | OUTPATIENT
Start: 2021-01-28 | End: 2021-01-28

## 2021-01-28 RX ORDER — LEVOTHYROXINE SODIUM 0.05 MG/1
50 TABLET ORAL DAILY
Qty: 30 TABLET | Refills: 5 | Status: SHIPPED | OUTPATIENT
Start: 2021-01-28

## 2021-01-28 RX ORDER — LOSARTAN POTASSIUM 100 MG/1
100 TABLET ORAL DAILY
Qty: 30 TABLET | Refills: 5 | Status: SHIPPED | OUTPATIENT
Start: 2021-01-28

## 2021-01-28 RX ORDER — MEMANTINE HYDROCHLORIDE 10 MG/1
10 TABLET ORAL 2 TIMES DAILY
Qty: 60 TABLET | Refills: 5 | Status: SHIPPED | OUTPATIENT
Start: 2021-01-28 | End: 2021-01-28

## 2021-01-28 RX ORDER — CHOLECALCIFEROL (VITAMIN D3) 125 MCG
500 CAPSULE ORAL DAILY
Qty: 30 TABLET | Refills: 5 | Status: SHIPPED | OUTPATIENT
Start: 2021-01-28

## 2021-01-28 RX ORDER — SENNOSIDES 8.6 MG
650 CAPSULE ORAL EVERY 8 HOURS PRN
Qty: 90 TABLET | Refills: 5 | Status: SHIPPED | OUTPATIENT
Start: 2021-01-28

## 2021-01-28 RX ORDER — DONEPEZIL HYDROCHLORIDE 10 MG/1
10 TABLET, FILM COATED ORAL NIGHTLY
Qty: 30 TABLET | Refills: 5 | Status: SHIPPED | OUTPATIENT
Start: 2021-01-28 | End: 2021-01-28

## 2021-01-28 RX ORDER — ZINC OXIDE 13 %
1 CREAM (GRAM) TOPICAL DAILY
Qty: 30 CAPSULE | Refills: 5 | Status: SHIPPED | OUTPATIENT
Start: 2021-01-28 | End: 2021-01-28

## 2021-01-28 RX ORDER — SENNOSIDES 8.6 MG
650 CAPSULE ORAL EVERY 8 HOURS PRN
Qty: 90 TABLET | Refills: 5 | Status: SHIPPED | OUTPATIENT
Start: 2021-01-28 | End: 2021-01-28

## 2021-01-28 RX ORDER — MONTELUKAST SODIUM 4 MG/1
1 TABLET, CHEWABLE ORAL DAILY
Qty: 30 TABLET | Refills: 5 | Status: SHIPPED | OUTPATIENT
Start: 2021-01-28

## 2021-01-28 RX ORDER — LOSARTAN POTASSIUM 100 MG/1
100 TABLET ORAL DAILY
Qty: 30 TABLET | Refills: 5 | Status: SHIPPED | OUTPATIENT
Start: 2021-01-28 | End: 2021-01-28

## 2021-01-28 RX ORDER — AMLODIPINE BESYLATE 2.5 MG/1
2.5 TABLET ORAL DAILY
Qty: 30 TABLET | Refills: 5 | Status: SHIPPED | OUTPATIENT
Start: 2021-01-28 | End: 2021-01-28

## 2021-01-28 RX ORDER — PHENOL 1.4 %
600 AEROSOL, SPRAY (ML) MUCOUS MEMBRANE DAILY
Qty: 30 TABLET | Refills: 5 | Status: SHIPPED | OUTPATIENT
Start: 2021-01-28

## 2021-01-28 NOTE — TELEPHONE ENCOUNTER
DORCAS FROM [facility] CALLING STATING THAT PATIENT GOT MEDICATION CALLED IN THAT SHE WAS PREVIOUSLY TAKEN OFF OF. DORCAS WOULD LIKE TO KNOW IF PATIENT IS TAKING THESE MEDICATION AGAIN.   vitamin B-12 (CYANOCOBALAMIN) 500 MCG tablet    calcium carbonate (OS-SONIDO) 600 MG tablet    CALL BACK: [phone] ENT 3

## 2021-01-28 NOTE — TELEPHONE ENCOUNTER
Yes they were on the list given to us by Tomas. But she can disregard.  I mistakenly sent RXs to donn today instead of printing them for son to take to [place] with him.

## 2021-01-28 NOTE — PROGRESS NOTES
"[facility]     Julieta King  1940   [id number]      Patient Care Team:  Mary Jane Goss MD as PCP - General  Mary Jane Goss MD as PCP - Family Medicine    Chief Complaint   Patient presents with   • Hypertension     f/u   • Papers for new facility      Patient is accompanied by her son Manuel King who contributes to the history.      HPI  Patient is a 80 y.o. female presents with moving to new memory care facility in [place] close to son's home. They will be leaving tomorrow.  They need papers filled out, a medicine list, prescriptions for all medications.    Her medication box is currently filled by the local pharmacy and delivered to her.  She has been taking medications as prescribed.    CHRONIC CONDITIONS    Memory has been slowly getting worse and function with ADLs has been gradually declining over the past couple years.  She takes donepezil 10 mg every evening and Namenda 10 mg twice a day for memory.  She does not have any side effects.  She takes quetiapine every evening and she seems to be sleeping through the night.  No behavioral problems reported.    She takes Escitalopram 10 mg daily and that seems to be controlling any depression symptoms or anxiety symptoms.  She and her son both admit that she has been a little down lately due to being quarantined.  She states, \"I am a people person\".  For hypertension, she takes amlodipine and losartan.  The son is not aware of any blood pressure readings where she lives.  It is controlled on second check today.    For hyperlipidemia, she eats low fairly low-fat diet.  She has been less active since she has been in quarantine due to Covid pandemic.    For hypothyroidism, she takes levothyroxine daily.    Functional diarrhea has been controlled with colestipol once a day.    Past Medical History:   Diagnosis Date   • Benign essential hypertension    • Bilateral hip joint arthritis    • Cataract    • Dementia (CMS/HCC)    • GERD " without esophagitis    • Hyperlipidemia    • Hypertension    • Hyponatremia     metabolic encephalopathy/viral bronchitis/dehydration/abnormal liver enzymes   • Hypothyroidism (acquired)    • Memory deficit    • Mixed hyperlipidemia    • Osteoarthritis    • Osteopenia of multiple sites    • Ovarian cyst      and uterine fibroids   • Seborrheic keratoses    • Vitamin D deficiency        Past Surgical History:   Procedure Laterality Date   • BLEPHAROPLASTY Bilateral 2007   • TOTAL HIP ARTHROPLASTY Bilateral 1992   • TOTAL HIP ARTHROPLASTY REVISION Right 01/28/2013   • TOTAL HIP ARTHROPLASTY REVISION Left 2012       Family History   Problem Relation Age of Onset   • Dementia Father    • Diabetes Father    • Coronary artery disease Father    • Hypertension Father    • Coronary artery disease Mother    • Hypertension Mother    • Stroke Mother         hemorrhagic after TPA   • Coronary artery disease Paternal Uncle    • Ovarian cancer Maternal Grandmother    • Uterine cancer Maternal Grandmother        Social History     Socioeconomic History   • Marital status:      Spouse name: Not on file   • Number of children: Not on file   • Years of education: Not on file   • Highest education level: Not on file   Tobacco Use   • Smoking status: Never Smoker   • Smokeless tobacco: Never Used   Substance and Sexual Activity   • Alcohol use: Yes     Comment: rarely   • Drug use: No   • Sexual activity: Defer       Allergies   Allergen Reactions   • Lisinopril Cough       Review of Systems:     Review of Systems   Constitutional: Negative for chills, fatigue, fever, unexpected weight gain and unexpected weight loss.   HENT: Negative for sore throat and trouble swallowing.    Eyes: Positive for visual disturbance.        Her right lenses missing from her glasses.  The son did not receive any report about how that happened when he arrived in [place].   Respiratory: Negative for cough, shortness of breath and wheezing.   "  Cardiovascular: Negative for chest pain, palpitations and leg swelling.   Gastrointestinal: Negative for abdominal pain, blood in stool, constipation, diarrhea and GERD.   Endocrine: Negative for cold intolerance and heat intolerance.   Genitourinary: Negative for dysuria, frequency and urinary incontinence.   Musculoskeletal: Negative for arthralgias, back pain, gait problem and joint swelling.   Skin: Negative for rash and skin lesions.   Neurological: Positive for memory problem. Negative for dizziness and headache.   Hematological: Negative for adenopathy. Does not bruise/bleed easily.   Psychiatric/Behavioral: Positive for dysphoric mood. Negative for sleep disturbance, suicidal ideas and depressed mood. The patient is not nervous/anxious.        Vital Signs  Vitals:    01/28/21 1029 01/28/21 1110   BP: 140/72 130/72   BP Location: Left arm    Patient Position: Sitting    Cuff Size: Adult    Pulse: 77    Temp: 97.1 °F (36.2 °C)    TempSrc: Temporal    SpO2: 96%    Weight: 69.4 kg (153 lb)    Height: 160 cm (63\")    PainSc: 0-No pain      Body mass index is 27.1 kg/m².      Current Outpatient Medications:   •  acetaminophen (TYLENOL) 650 MG 8 hr tablet, Take 1 tablet by mouth Every 8 (Eight) Hours As Needed for Mild Pain ., Disp: 90 tablet, Rfl: 5  •  amLODIPine (NORVASC) 2.5 MG tablet, Take 1 tablet by mouth Daily., Disp: 30 tablet, Rfl: 5  •  colestipol (COLESTID) 1 g tablet, Take 1 tablet by mouth Daily., Disp: 30 tablet, Rfl: 5  •  donepezil (ARICEPT) 10 MG tablet, Take 1 tablet by mouth Every Night., Disp: 30 tablet, Rfl: 5  •  escitalopram (LEXAPRO) 20 MG tablet, Take 0.5 tablets by mouth Daily., Disp: 30 tablet, Rfl: 5  •  levothyroxine (SYNTHROID, LEVOTHROID) 50 MCG tablet, Take 1 tablet by mouth Daily., Disp: 30 tablet, Rfl: 5  •  losartan (COZAAR) 100 MG tablet, Take 1 tablet by mouth Daily., Disp: 30 tablet, Rfl: 5  •  memantine (NAMENDA) 10 MG tablet, Take 1 tablet by mouth 2 (Two) Times a Day., " Disp: 60 tablet, Rfl: 5  •  QUEtiapine (SEROquel) 25 MG tablet, Take 1 tablet by mouth Every Night., Disp: 30 tablet, Rfl: 5  •  calcium carbonate (OS-SONIDO) 600 MG tablet, Take 1 tablet by mouth Daily., Disp: 30 tablet, Rfl: 5  •  Probiotic Product (Probiotic Daily) capsule, Take 1 capsule by mouth Daily., Disp: 30 capsule, Rfl: 5  •  vitamin B-12 (CYANOCOBALAMIN) 500 MCG tablet, Take 1 tablet by mouth Daily., Disp: 30 tablet, Rfl: 5    Physical Exam:    Physical Exam  Vitals signs and nursing note reviewed.   Constitutional:       Appearance: She is well-developed and overweight.   HENT:      Head: Normocephalic.   Eyes:      Conjunctiva/sclera: Conjunctivae normal.      Pupils: Pupils are equal, round, and reactive to light.   Neck:      Musculoskeletal: Normal range of motion and neck supple.      Thyroid: No thyromegaly.   Cardiovascular:      Rate and Rhythm: Normal rate and regular rhythm.      Heart sounds: Normal heart sounds.   Pulmonary:      Effort: Pulmonary effort is normal.      Breath sounds: Normal breath sounds. No wheezing.   Musculoskeletal: Normal range of motion.   Lymphadenopathy:      Cervical: No cervical adenopathy.   Skin:     General: Skin is warm and dry.   Neurological:      Mental Status: She is alert and oriented to person, place, and time.      Cranial Nerves: Cranial nerves are intact.      Sensory: Sensation is intact.      Motor: Motor function is intact.      Gait: Gait is intact.   Psychiatric:         Attention and Perception: Attention normal.         Mood and Affect: Mood and affect normal.         Speech: Speech normal.         Behavior: Behavior is cooperative.         Thought Content: Thought content normal. Thought content is not paranoid or delusional.         Cognition and Memory: Cognition is impaired. She exhibits impaired recent memory.          ACE III MINI   ATTENTION  What is the year: incorrect  What is the month of the year: incorrect  What is the day of the  week?: incorrect  What is the date?: incorrect  MEMORY  Repeat address three times, only score third attempt: [address], [place]: 0  HOW MANY ANIMALS DID THE PATIENT NAME  Verbal Fluency -- Animal Names (0-25): 11-13  CLOCK DRAWING  Clock Drawing: [place]  MEMORY RECALL  Tell me what you remember about that name and address we were repeating at the beginnin  ACE TOTAL SCORE  Total ACE Score - <25/30 strongly suggests cognitive impairment; <21/30 almost certainly shows dementia: 5    Results Review:    None    CMP:  Lab Results   Component Value Date    BUN 18 2021    CREATININE 1.01 (H) 2021    EGFRIFNONA 53 (L) 2021    BCR 17.8 2021     2021    K 4.1 2021    CO2 23.0 2021    CALCIUM 10.4 2021    ALBUMIN 3.90 2021    BILITOT 0.2 2021    ALKPHOS 89 2021    AST 14 2021    ALT 7 2021     HbA1c:  No results found for: HGBA1C  Microalbumin:  Lab Results   Component Value Date    POCMALB 80mg/L 2020    POCCREAT 300mg/dL 2020     Lipid Panel  Lab Results   Component Value Date    AST 14 2021    ALT 7 2021       Medication Review: Medications reviewed and noted  Patient Instructions   Problem List Items Addressed This Visit        Cardiac and Vasculature    Benign essential hypertension    Overview     2021 Mary Jane Goss MD    Continue amlodipine and losartan daily.    Continue to avoid salt in the diet.         Relevant Medications    amLODIPine (NORVASC) 2.5 MG tablet    losartan (COZAAR) 100 MG tablet    Mixed hyperlipidemia    Overview     2021 Mary Jane Goss MD    Continue healthy diet.  Try to be more active around the house.         Relevant Medications    colestipol (COLESTID) 1 g tablet       ENT    Presbycusis of both ears (Chronic)    Overview     2021 Mary Jane Goss MD    Son was encouraged to take her to have her hearing evaluated.             Endocrine and Metabolic    Hypothyroidism (acquired)    Overview     1/28/2021 Mary Jane Goss MD  Recent TSH when the patient presented at the ER with UTI was appropriate.    Continue 50 mcg tablet levothyroxine daily.         Relevant Medications    levothyroxine (SYNTHROID, LEVOTHROID) 50 MCG tablet       Gastrointestinal Abdominal     Functional diarrhea    Overview     1/28/2021 Mary Jane Goss MD    Continue Colestid to once a day.    It may be stopped in the future if she continues to have no symptoms.            Genitourinary and Reproductive     Acute cystitis without hematuria    Overview     1/28/2021 Mary Jane Goss MD    Patient was treated in the ER with Keflex on 1/20/2021 for acute UTI with acute mental status changes.  Urinary tract symptoms have resolved.  The patient's memory and confusion has improved.    We will stop the antibiotic today.            Musculoskeletal and Injuries    Osteopenia of multiple sites    Overview     1/28/2021 Mary Jane Goss MD    Patient has not done a DEXA scan for several years although it has been ordered.    She does take calcium daily.            Neuro    Late onset Alzheimer's disease without behavioral disturbance (CMS/HCC) - Primary    Overview     1/28/2021 Mary Jane Goss MD    Continue memantine twice a day. Continue donepezil every evening.  Continue quetiapine in the evenings.    Moving to a memory care unit close to the son's home will be a very good move for her.         Relevant Medications    donepezil (ARICEPT) 10 MG tablet    escitalopram (LEXAPRO) 20 MG tablet    memantine (NAMENDA) 10 MG tablet    QUEtiapine (SEROquel) 25 MG tablet       Other    Overweight with body mass index (BMI) of 27 to 27.9 in adult (Chronic)    Overview     1/28/2021 Mary Jane Goss MD    Patient is encouraged to be as active as possible around her apartment.  She is encouraged to avoid sugars and snack foods.           Other Visit Diagnoses      Screening-pulmonary TB        Relevant Orders    XR Chest PA & Lateral             Diagnosis Plan   1. Late onset Alzheimer's disease without behavioral disturbance (CMS/HCC)     2. Acute cystitis without hematuria     3. Benign essential hypertension  amLODIPine (NORVASC) 2.5 MG tablet   4. Mixed hyperlipidemia     5. Hypothyroidism (acquired)     6. Overweight with body mass index (BMI) of 27 to 27.9 in adult     7. Functional diarrhea     8. Presbycusis of both ears     9. Osteopenia of multiple sites     10. Screening-pulmonary TB  XR Chest PA & Lateral       Patient is moving to a memory care unit in [place] to be near her son. She is wished well.      I spent 42 minutes face to face with the patient and her son.  I spent 17 minutes filling out her paperwork for new memory facility and getting prescription in order and printed for son to take to [place] and coordinating care.      Plan of care reviewed with patient at the conclusion of today's visit. Education was provided regarding diagnosis, management, and any prescribed or recommended OTC medications.Patient verbalizes understanding of and agreement with management plan.         Mary Jane Goss MD

## 2021-08-15 NOTE — PATIENT INSTRUCTIONS
Patient Instructions   Problem List Items Addressed This Visit        Cardiac and Vasculature    Benign essential hypertension    Overview     1/28/2021 Mary Jane Goss MD    Continue amlodipine and losartan daily.    Continue to avoid salt in the diet.         Relevant Medications    amLODIPine (NORVASC) 2.5 MG tablet    losartan (COZAAR) 100 MG tablet    Mixed hyperlipidemia    Overview     1/28/2021 Mary Jane Goss MD    Continue healthy diet.  Try to be more active around the house.         Relevant Medications    colestipol (COLESTID) 1 g tablet       ENT    Presbycusis of both ears (Chronic)    Overview     1/28/2021 Mary Jane Goss MD    Son was encouraged to take her to have her hearing evaluated.            Endocrine and Metabolic    Hypothyroidism (acquired)    Overview     1/28/2021 Mary Jane Goss MD  Recent TSH when the patient presented at the ER with UTI was appropriate.    Continue 50 mcg tablet levothyroxine daily.         Relevant Medications    levothyroxine (SYNTHROID, LEVOTHROID) 50 MCG tablet       Gastrointestinal Abdominal     Functional diarrhea    Overview     1/28/2021 Mary Jane Goss MD    Continue Colestid to once a day.    It may be stopped in the future if she continues to have no symptoms.            Genitourinary and Reproductive     Acute cystitis without hematuria    Overview     1/28/2021 Mary Jane Goss MD    Patient was treated in the ER with Keflex on 1/20/2021 for acute UTI with acute mental status changes.  Urinary tract symptoms have resolved.  The patient's memory and confusion has improved.    We will stop the antibiotic today.            Musculoskeletal and Injuries    Osteopenia of multiple sites    Overview     1/28/2021 Mary Jane Goss MD    Patient has not done a DEXA scan for several years although it has been ordered.    She does take calcium daily.            Neuro    Late onset Alzheimer's disease without behavioral disturbance (CMS/HCC) - Primary     Resulted after Emergency Dept visit on 8/12/21  Routed to PCP, Dr Pereira and to Dr Leventhal (appt on 10/4/21) Overview     1/28/2021 Mary Jane Goss MD    Continue memantine twice a day. Continue donepezil every evening.  Continue quetiapine in the evenings.    Moving to a memory care unit close to the son's home will be a very good move for her.         Relevant Medications    donepezil (ARICEPT) 10 MG tablet    escitalopram (LEXAPRO) 20 MG tablet    memantine (NAMENDA) 10 MG tablet    QUEtiapine (SEROquel) 25 MG tablet       Other    Overweight with body mass index (BMI) of 27 to 27.9 in adult (Chronic)    Overview     1/28/2021 Mary Jane Goss MD    Patient is encouraged to be as active as possible around her apartment.  She is encouraged to avoid sugars and snack foods.           Other Visit Diagnoses     Screening-pulmonary TB        Relevant Orders    XR Chest PA & Lateral             Alzheimer Disease Caregiver Guide    Alzheimer disease is a brain disease that causes memory loss and changes in behavior. People with Alzheimer disease often have problems paying attention, communicating, and doing routine tasks. The disease gets worse over time, and people with the disease eventually need full-time care.  Taking care of someone with Alzheimer disease can be challenging and overwhelming. This guide provides helpful information and tips that can make caring for someone with the disease a little easier.  What changes does Alzheimer disease cause?  Alzheimer disease causes a person to lose the ability to remember things and make decisions. Memory loss and confusion are usually mild at the start of the disease, but they get more severe over time. Eventually, the person may not recognize friends, family members, or familiar places.  Alzheimer disease can also cause hallucinations, changes in behavior, and changes in mood, such as anxiety or anger. The changes can come on suddenly. They may happen in response to something such as:  · Pain.  · An infection.  · Changes in environment, such as changes in temperature or  noise.  · Overstimulation.  · Feeling lost or scared.  Tips for managing symptoms  · Be calm and patient.  · Give short, simple answers to questions. Long answers can overwhelm and confuse the person.  · Avoid correcting the person in a negative way.  · Try not to take things personally, even if the person forgets your name. Understand that changes are a part of the disease process.  · Do not argue or try to convince the person about a specific point. Doing that may make the person feel more agitated.  Tips for reducing frustration  · Make appointments and do daily tasks, like bathing and dressing, when the person is at his or her best.  · Allow for plenty of time for simple tasks because they may take longer than expected. Take your time when doing these tasks.  · Limit the person's choices. Too many choices can be overwhelming and stressful for the person.  · Involve the person in what you are doing.  · Keep a daily routine.  · Avoid crowds and new situations, if possible.  · Use simple words, short sentences, and a calm voice. Only give one direction at a time.  · Buy clothes and shoes that are easy to put on and take off.  · Organize medications in a pillbox for each day of the week.  · Keep a calendar in a central location to remind the person of appointments or other activities.  · Ask about respite care resources so that you can have a regular break from the stress of caregiving.  Tips for reducing the risk of injury  · Keep floors clear of clutter. Remove rugs, magazine racks, and floor lamps.  · Keep hallways well-lit, especially at night.  · Put a handrail and nonslip mat in the bathtub or shower.  · Put childproof locks on cabinets that contain dangerous items, such as medicines, alcohol, guns, toxic cleaning items, sharp tools or utensils, matches, and lighters.  · Put locks on doors. Put the locks in places where the person cannot see or reach them easily. This will help ensure that the person does not  wander out of the house and get lost.  · Be prepared for emergencies. Keep a list of emergency phone numbers and addresses in a convenient area.  · Remove car keys and lock garage doors so that the person does not try to get in the car and drive.  · A certain type of bracelet may be worn that tracks a person's location and identifies him or her as having memory problems. This should be worn at all times for safety.  Tips for future planning  · Discuss financial and legal planning early on in the course of the disease. People with Alzheimer disease will have trouble managing their money as the disease gets worse. Get help from professional advisers regarding financial and legal matters.  · Discuss advance directives, safety, and daily care. Take these steps:  ? Create a living will and choose a power of . The person with power of  will be able to make decisions for the person with Alzheimer disease when he or she is no longer able to.  ? Discuss driving safety and when to stop driving. The person's health care provider can help provide assistance with this decision.  ? Discuss the person's living situation. If the person lives alone, make sure he or she is safe. People who live at home may need extra help from home health caregivers, and those who live in a nursing home or care center may need more care.  Where to find support  One way to find support is to join a local support group. Advantages of being part of a support group include:  · Learning strategies to manage stress.  · Sharing experiences with others.  · Receiving emotional comfort and support.  · Learning about caregiving as the disease progresses.  · Knowing what community resources are available and making use of them.  Where to find more information  · Alzheimer's Association: www.alz.org  Contact a health care provider if:  · The person has a fever.  · The person has a sudden change in behavior that does not improve with calming  strategies.  · The person is unable to manage in his or her current living situation.  · The person threatens himself or herself, you, or anyone else.  · You are no longer able to care for the person.  Summary  · Alzheimer disease is a brain disease that causes memory loss and changes in behavior.  · People with Alzheimer disease often have problems paying attention, communicating, and doing routine tasks. The disease gets worse over time, and people with the disease eventually need full-time care.  · Take steps to reduce the person's risk of injury, and plan for future care.  · Taking care of someone with Alzheimer disease can be very challenging and overwhelming. One way to find support during this time is to join a local support group.  This information is not intended to replace advice given to you by your health care provider. Make sure you discuss any questions you have with your health care provider.  Document Revised: 04/07/2020 Document Reviewed: 01/19/2018  [employer] Patient Education © 2020 [employer]    BMI for Adults  What is BMI?  Body mass index (BMI) is a number that is calculated from a person's weight and height. BMI can help estimate how much of a person's weight is composed of fat. BMI does not measure body fat directly. Rather, it is an alternative to procedures that directly measure body fat, which can be difficult and expensive.  BMI can help identify people who may be at higher risk for certain medical problems.  What are BMI measurements used for?  BMI is used as a screening tool to identify possible weight problems. It helps determine whether a person is obese, overweight, a healthy weight, or underweight.  BMI is useful for:  · Identifying a weight problem that may be related to a medical condition or may increase the risk for medical problems.  · Promoting changes, such as changes in diet and exercise, to help reach a healthy weight. BMI screening can be repeated to see if these changes  "are working.  How is BMI calculated?  BMI involves measuring your weight in relation to your height. Both height and weight are measured, and the BMI is calculated from those numbers. This can be done either in English ([place] or metric measurements. Note that charts and online BMI calculators are available to help you find your BMI quickly and easily without having to do these calculations yourself.  To calculate your BMI in English ([place] measurements:    1. Measure your weight in pounds (lb).  2. Multiply the number of pounds by 703.  ? For example, for a person who weighs 180 lb, multiply that number by 703, which equals 126,540.  3. Measure your height in inches. Then multiply that number by itself to get a measurement called \"inches squared.\"  ? For example, for a person who is 70 inches tall, the \"inches squared\" measurement is 70 inches x 70 inches, which equals 4,900 inches squared.  4. Divide the total from step 2 (number of lb x 703) by the total from step 3 (inches squared): 126,540 ÷ 4,900 = 25.8. This is your BMI.  To calculate your BMI in metric measurements:  1. Measure your weight in kilograms (kg).  2. Measure your height in meters (m). Then multiply that number by itself to get a measurement called \"meters squared.\"  ? For example, for a person who is 1.75 m tall, the \"meters squared\" measurement is 1.75 m x 1.75 m, which is equal to 3.1 meters squared.  3. Divide the number of kilograms (your weight) by the meters squared number. In this example: 70 ÷ 3.1 = 22.6. This is your BMI.  What do the results mean?  BMI charts are used to identify whether you are underweight, normal weight, overweight, or obese. The following guidelines will be used:  · Underweight: BMI less than 18.5.  · Normal weight: BMI between 18.5 and 24.9.  · Overweight: BMI between 25 and 29.9.  · Obese: BMI of 30 or above.  Keep these notes in mind:  · Weight includes both fat and muscle, so someone with a muscular build, such as " an athlete, may have a BMI that is higher than 24.9. In cases like these, BMI is not an accurate measure of body fat.  · To determine if excess body fat is the cause of a BMI of 25 or higher, further assessments may need to be done by a health care provider.  · BMI is usually interpreted in the same way for men and women.  Where to find more information  For more information about BMI, including tools to quickly calculate your BMI, go to these websites:  · Centers for Disease Control and Prevention: www.cdc.gov  · American Heart Association: www.heart.org  · National Heart, Lung, and [employer] [place]: www.nhlbi.nih.gov  Summary  · Body mass index (BMI) is a number that is calculated from a person's weight and height.  · BMI may help estimate how much of a person's weight is composed of fat. BMI can help identify those who may be at higher risk for certain medical problems.  · BMI can be measured using English measurements or metric measurements.  · BMI charts are used to identify whether you are underweight, normal weight, overweight, or obese.  This information is not intended to replace advice given to you by your health care provider. Make sure you discuss any questions you have with your health care provider.  Document Revised: 09/09/2020 Document Reviewed: 07/17/2020  [employer] Patient Education © 2020 [employer]